# Patient Record
Sex: MALE | Race: WHITE | NOT HISPANIC OR LATINO | Employment: OTHER | URBAN - METROPOLITAN AREA
[De-identification: names, ages, dates, MRNs, and addresses within clinical notes are randomized per-mention and may not be internally consistent; named-entity substitution may affect disease eponyms.]

---

## 2017-11-13 ENCOUNTER — ALLSCRIPTS OFFICE VISIT (OUTPATIENT)
Dept: OTHER | Facility: OTHER | Age: 72
End: 2017-11-13

## 2017-11-13 DIAGNOSIS — I10 ESSENTIAL (PRIMARY) HYPERTENSION: ICD-10-CM

## 2017-11-14 NOTE — PROGRESS NOTES
Assessment  Assessed    1  CAD (coronary artery disease) (414 00) (I25 10)   2  PAC (premature atrial contraction) (427 61) (I49 1)   3  Hyperlipidemia (272 4) (E78 5)   4  Hypertension (401 9) (I10)    Plan  CAD (coronary artery disease), PAC (premature atrial contraction)    · EKG/ECG- POC; Status:Complete;   Done: [id number]   Perform: In Office; [phone]; Last Updated By:Scheier, Lillard Pan; 11/13/2017 9:06:00 AM;Ordered;(coronary artery disease), PAC (premature atrial contraction); Ordered By:Michel Garcia; Hypertension    · HOLTER MONITOR - 24 HOUR; Status:Hold For - Scheduling; Requestedfor:13Nov2017;    Perform:[facility]; NQI:[id number]; Ordered;Ordered By:Michel Garcia;   · Follow-up visit in 1 year Evaluation and Treatment  Follow-up  Status: Complete  Done:13Nov2017   Ordered; Hypertension; Ordered By: Agustina Shrestha Performed:  Due: 54VOS4851; Last Updated By: Jeanie Alex; 11/13/2017 9:16:55 AM    Discussion/Summary  Cardiology Discussion Summary Free Text Note Form [facility]:   Atrial parasystolic, well controlled beta blocker therapy  Stress test 2013 waswith mild inferior ischemia, manage medically  Clinically asymptomatic  An echocardiogram revealed normal left ventricular systolic function with mild mitral and tricuspid insufficiency with estimated normal pulmonary artery pressures as suggested by Doppler criteria  Left atrial enlargement mildly increased  Continue current medical regimen  We'll repeat a Holter monitor  Chief Complaint  Chief Complaint Free Text Note Form: Pt  here for yearly follow up  Pt  has occasional palpitations  History of Present Illness  Cardiology HPI Free Text Note Form [facility]: Cardiology follow-up  Patient continues to do well  Occasional palpitations  No syncope or presyncope  He still very active  Comply medications, his lipids were recently checked, I do not have the fractionation, apparently under good control   Denies any chest pain      Review of Systems  Cardiology Male ROS:    Cardiac: rhythm problems, but-- no chest pain,-- no fainting/blackouts,-- no heart murmur present,-- no signs of swelling-- and-- no palpitations present  Skin: No complaints of nonhealing sores or skin rash  Genitourinary: No complaints of recurrent urinary tract infections, frequent urination at night, difficult urination, blood in urine, kidney stones, loss of bladder control, no kidney or prostate problems, no erectile dysfunction  -- and-- no kidney problems  Psychological: No complaints of feeling depressed, anxiety, panic attacks, or difficulty concentrating  General: No complaints of trouble sleeping, lack of energy, fatigue, appetite changes, weight changes, fever, frequent infections, or night sweats  ,-- no trouble sleeping,-- no changes in weight-- and-- no lack of energy/fatigue  Respiratory: No complaints of shortness of breath, cough with sputum, or wheezing -- and-- no shortness of breath  HEENT: No complaints of serious problems, hearing problems, nose problems, throat problems, or snoring  Gastrointestinal: No complaints of liver problems, nausea, vomiting, heartburn, constipation, bloody stools, diarrhea, problems swallowing, adbominal pain, or rectal bleeding -- and-- no liver problems  Hematologic: No complaints of bleeding disorders, anemia, blood clots, or excessive brusing  Neurological: No complaints of numbness, tingling, dizziness, weakness, seizures, headaches, syncope or fainting, AM fatigue, daytime sleepiness, no witnessed apnea episodes  Musculoskeletal: No complaints of arthritis, back pain, or painfull swelling -- No myalgias on statin  Active Problems  Problems    1  CAD (coronary artery disease) (414 00) (I25 10)   2  Hyperlipidemia (272 4) (E78 5)   3  Hypertension (401 9) (I10)   4  PAC (premature atrial contraction) (427 61) (I49 1)    Past Medical History  Problems    1   History of Abnormal EKG (794 31) (R94 31)  Active Problems And Past Medical History Reviewed: The active problems and past medical history were reviewed and updated today  Surgical History  Problems    1  History of Tonsillectomy  Surgical History Reviewed: The surgical history was reviewed and updated today  Family History  Mother    1  Family history of hypertension (V17 49) (Z82 49)  Father    2  Family history of hypertension (V17 49) (Z82 49)   3  FH: colon cancer (V16 0) (Z80 0)   4  Family history of Graphite fibrosis (of lung)  Family History Reviewed: The family history was reviewed and updated today  Social History  Problems    · Never smoker   · Retired  Social History Reviewed: The social history was reviewed and is unchanged  Current Meds   1  Aspirin 81 MG TABS; TAKE 1 TABLET DAILY; Therapy: (Recorded:02Nov2015) to Recorded   2  Daily Multivitamin TABS; Therapy: (Brisa Proctor) to Recorded   3  Lisinopril 10 MG Oral Tablet; TAKE 1 TABLET DAILY; Therapy: (0664 313 06 34) to Recorded   4  Lovastatin 20 MG Oral Tablet; TAKE 1 TABLET DAILY AS DIRECTED; Therapy: (0664 313 06 34) to Recorded   5  Metoprolol Tartrate 25 MG Oral Tablet; TAKE 1 TABLET TWICE DAILY  Requested for: 26Apr2017; Last Rx:26Apr2017 Ordered  Medication List Reviewed: The medication list was reviewed and updated today  Allergies  Medication    1  No Known Drug Allergies    Vitals  Vital Signs    Recorded: [id number] 09:04AM   Heart Rate 60, Apical   Pulse Quality Regular, Apical   Systolic 686, RUE, Sitting   Diastolic 80, RUE, Sitting   Height 5 ft 10 in   Weight 173 lb    BMI Calculated 24 82   BSA Calculated 1 96       Physical Exam   Constitutional  General appearance: No acute distress, well appearing and well nourished  appears healthy,-- within normal limits of ideal weight,-- well hydrated-- and-- appearance reflects stated age  Neck  Neck and thyroid: Normal, supple, trachea midline, no thyromegaly  Jugular Veins: the JVP was not elevated  Pulmonary  Respiratory effort: No increased work of breathing or signs of respiratory distress  Respiratory rate: normal  Assessment of respiratory effort revealed normal rhythm and effort  Auscultation of lungs: Clear to auscultation, no rales, no rhonchi, no wheezing, good air movement  Auscultation of the lungs revealed no expiratory wheezing,-- normal expiratory time-- and-- no inspiratory wheezing  no rales or crackles were heard bilaterally  no rhonchi  no friction rub  no wheezing  no diminished breath sounds  no bronchial breath sounds  Cardiovascular  Palpation of heart: Normal PMI, no thrills  The PMI was palpated at the 5th LICS in the midclavicular line  The apical impulse was normal  no precordial heave was noted  Auscultation of heart: Abnormal   The heart rate was normal  The rhythm was regular with premature beats  Heart sounds: normal S1,-- normal S2,-- no gallop heard,-- no S3-- and-- no S4  No pericardial rub  Bigeminy pattern  no murmurs were heard  Carotid pulses: Normal, 2+ bilaterally  Pedal pulses: Normal, 2+ bilaterally  Examination of extremities for edema and/or varicosities: Normal    Chest - Chest: Normal   Neurologic - Speech: Normal    Psychiatric - Orientation to person, place, and time: Normal -- Mood and affect: Normal       Results/Data  ECG Report:  Rhythm and rate:  normal sinus rhythm  Ectopic Beats: Occasional unifocal premature ventricular beats are present  Frequent atrial premature contractions are present  QRS: poor R-wave progresion  Comparison to prior ECGs: no interval change   Probable atrial parasystolic      Signatures   Electronically signed by : PACO Rob ; Nov 13 2017  9:19AM EST                       (Author)

## 2017-11-22 ENCOUNTER — HOSPITAL ENCOUNTER (OUTPATIENT)
Dept: NON INVASIVE DIAGNOSTICS | Facility: HOSPITAL | Age: 72
Discharge: HOME | End: 2017-11-22
Payer: MEDICARE

## 2017-11-22 DIAGNOSIS — I10 ESSENTIAL (PRIMARY) HYPERTENSION: ICD-10-CM

## 2017-11-22 PROCEDURE — 93226 XTRNL ECG REC<48 HR SCAN A/R: CPT

## 2017-11-22 PROCEDURE — 93225 XTRNL ECG REC<48 HRS REC: CPT

## 2018-01-13 VITALS
HEIGHT: 70 IN | DIASTOLIC BLOOD PRESSURE: 80 MMHG | SYSTOLIC BLOOD PRESSURE: 140 MMHG | BODY MASS INDEX: 24.77 KG/M2 | WEIGHT: 173 LBS | HEART RATE: 60 BPM

## 2018-04-30 DIAGNOSIS — I10 HYPERTENSION, UNSPECIFIED TYPE: Primary | ICD-10-CM

## 2018-11-13 ENCOUNTER — OFFICE VISIT (OUTPATIENT)
Dept: CARDIOLOGY CLINIC | Facility: CLINIC | Age: 73
End: 2018-11-13
Payer: MEDICARE

## 2018-11-13 VITALS
HEART RATE: 54 BPM | DIASTOLIC BLOOD PRESSURE: 80 MMHG | BODY MASS INDEX: 24.34 KG/M2 | HEIGHT: 70 IN | WEIGHT: 170 LBS | SYSTOLIC BLOOD PRESSURE: 140 MMHG

## 2018-11-13 DIAGNOSIS — I49.1: Chronic | ICD-10-CM

## 2018-11-13 DIAGNOSIS — I25.10 CORONARY ARTERY DISEASE INVOLVING NATIVE CORONARY ARTERY OF NATIVE HEART WITHOUT ANGINA PECTORIS: Primary | ICD-10-CM

## 2018-11-13 PROCEDURE — 93000 ELECTROCARDIOGRAM COMPLETE: CPT | Performed by: INTERNAL MEDICINE

## 2018-11-13 PROCEDURE — 99213 OFFICE O/P EST LOW 20 MIN: CPT | Performed by: INTERNAL MEDICINE

## 2018-11-13 RX ORDER — LISINOPRIL 10 MG/1
1 TABLET ORAL DAILY
COMMUNITY
End: 2021-06-08 | Stop reason: SDUPTHER

## 2018-11-13 RX ORDER — LOVASTATIN 20 MG/1
TABLET ORAL
Refills: 0 | COMMUNITY
Start: 2018-11-07 | End: 2021-07-27 | Stop reason: SDUPTHER

## 2018-11-13 RX ORDER — ERGOCALCIFEROL (VITAMIN D2) 10 MCG
TABLET ORAL
COMMUNITY

## 2018-11-13 NOTE — PROGRESS NOTES
Cardiology Follow Up    Alfredo Jay  [birth year]  [id number]  [address] [facility] [address] [place] [address]  [phone]    1  Coronary artery disease involving native coronary artery of native heart without angina pectoris  POCT ECG   2  Atrial parasystole         Interval History:   Cardiology follow-up  Patient continues to do well, is mostly asymptomatic, he gets dyspnea class 1 with unusual efforts no chest pain no syncope presyncope  Overall very active  He had recent lipid profile apparently under adequate control  Patient Active Problem List   Diagnosis    Atrial parasystole     No past medical history on file  Social History     Social History    Marital status: /Civil Union     Spouse name: N/A    Number of children: N/A    Years of education: N/A     Occupational History    Not on file  Social History Main Topics    Smoking status: Never Smoker    Smokeless tobacco: Never Used    Alcohol use Not on file    Drug use: Unknown    Sexual activity: Not on file     Other Topics Concern    Not on file     Social History Narrative    No narrative on file      No family history on file  No past surgical history on file  Current Outpatient Prescriptions:     aspirin 81 MG tablet, Take 1 tablet by mouth daily, Disp: , Rfl:     lisinopril (ZESTRIL) 10 mg tablet, Take 1 tablet by mouth daily, Disp: , Rfl:     lovastatin (MEVACOR) 20 mg tablet, , Disp: , Rfl: 0    metoprolol tartrate (LOPRESSOR) 25 mg tablet, Take 1 tablet (25 mg total) by mouth 2 (two) times a day, Disp: 180 tablet, Rfl: 3    Multiple Vitamin (DAILY VALUE MULTIVITAMIN) TABS, Take by mouth, Disp: , Rfl:   No Known Allergies    Labs:  No visits with results within 6 Month(s) from this visit  Latest known visit with results is:   No results found for any previous visit  Imaging: No results found      Review of Systems:  Review of Systems   Constitutional: Negative for activity change and fatigue  Respiratory: Positive for shortness of breath  Negative for apnea, wheezing and stridor  Cardiovascular: Negative for chest pain, palpitations and leg swelling  Gastrointestinal: Negative for abdominal pain  Musculoskeletal: Negative for myalgias  Neurological: Negative for dizziness and syncope  Physical Exam:  Physical Exam   Constitutional: He is oriented to person, place, and time  He appears well-developed and well-nourished  No distress  Eyes: No scleral icterus  Cardiovascular: Normal rate and intact distal pulses  Exam reveals no friction rub  No murmur heard  Frequent extrasystoles, bigeminy pattern   Pulmonary/Chest: Effort normal and breath sounds normal  No respiratory distress  He has no wheezes  He has no rales  He exhibits no tenderness  Neurological: He is alert and oriented to person, place, and time  Skin: He is not diaphoretic  Psychiatric: He has a normal mood and affect  Discussion/Summary:  Cardiac arrhythmia, atrial para systole, stable on low-dose beta-blocker  No new recommendations  Holter monitor last year revealed resting normal sinus rhythm, PAC burden of 37 percent secondary to the atrial per systole  Abnormal stress test in 2013, possible inferior ischemia on nuclear portion  Left systolic function normal with mild mitral tricuspid insufficiency estimated normal pulmonary pressures suggested by Doppler analysis    Repeat a regular stress test

## 2018-11-20 ENCOUNTER — HOSPITAL ENCOUNTER (OUTPATIENT)
Dept: NON INVASIVE DIAGNOSTICS | Facility: CLINIC | Age: 73
Discharge: HOME | End: 2018-11-20
Payer: MEDICARE

## 2018-11-20 DIAGNOSIS — I49.1: Chronic | ICD-10-CM

## 2018-11-20 DIAGNOSIS — I25.10 CORONARY ARTERY DISEASE INVOLVING NATIVE CORONARY ARTERY OF NATIVE HEART WITHOUT ANGINA PECTORIS: ICD-10-CM

## 2018-11-20 LAB
CHEST PAIN STATEMENT: NORMAL
MAX DIASTOLIC BP: 86 MMHG
MAX HEART RATE: 139 BPM
MAX PREDICTED HEART RATE: 146 BPM
MAX. SYSTOLIC BP: 212 MMHG
PROTOCOL NAME: NORMAL
REASON FOR TERMINATION: NORMAL
TARGET HR FORMULA: NORMAL
TEST INDICATION: NORMAL
TIME IN EXERCISE PHASE: NORMAL

## 2018-11-20 PROCEDURE — 93016 CV STRESS TEST SUPVJ ONLY: CPT | Performed by: INTERNAL MEDICINE

## 2018-11-20 PROCEDURE — 93018 CV STRESS TEST I&R ONLY: CPT | Performed by: INTERNAL MEDICINE

## 2018-11-20 PROCEDURE — 93017 CV STRESS TEST TRACING ONLY: CPT

## 2019-04-17 DIAGNOSIS — I10 HYPERTENSION, UNSPECIFIED TYPE: ICD-10-CM

## 2019-11-21 ENCOUNTER — OFFICE VISIT (OUTPATIENT)
Dept: CARDIOLOGY CLINIC | Facility: CLINIC | Age: 74
End: 2019-11-21
Payer: MEDICARE

## 2019-11-21 VITALS
BODY MASS INDEX: 24.05 KG/M2 | HEART RATE: 60 BPM | DIASTOLIC BLOOD PRESSURE: 80 MMHG | HEIGHT: 70 IN | SYSTOLIC BLOOD PRESSURE: 166 MMHG | WEIGHT: 168 LBS

## 2019-11-21 DIAGNOSIS — R94.39 ABNORMAL STRESS TEST: ICD-10-CM

## 2019-11-21 DIAGNOSIS — E78.00 HYPERCHOLESTEROLEMIA: ICD-10-CM

## 2019-11-21 DIAGNOSIS — I10 HYPERTENSION, ESSENTIAL, BENIGN: ICD-10-CM

## 2019-11-21 DIAGNOSIS — I49.1: Primary | Chronic | ICD-10-CM

## 2019-11-21 PROCEDURE — 93000 ELECTROCARDIOGRAM COMPLETE: CPT | Performed by: INTERNAL MEDICINE

## 2019-11-21 PROCEDURE — 99213 OFFICE O/P EST LOW 20 MIN: CPT | Performed by: INTERNAL MEDICINE

## 2019-11-21 NOTE — PROGRESS NOTES
Cardiology Follow Up    Theresa Mazariegos  1945  [id number]  [facility]  [facility]  [facility]   [phone]    1  Atrial parasystole  POCT ECG   2  Abnormal stress test     3  Hypercholesterolemia     4  Hypertension, essential, benign         Interval History:   Cardiology follow-up  Patient continues to do well, is still very active  No exertional symptoms no chest pain or dyspnea  No palpitations, no syncope or presyncope compliant with low-cholesterol diet  He is on statin therapy  Compliant low-sodium diet, blood pressures been well control as well  Today's is slightly elevated,    Patient Active Problem List   Diagnosis    Atrial parasystole    Abnormal stress test    Hypercholesterolemia    Hypertension, essential, benign     No past medical history on file    Social History     Socioeconomic History    Marital status: /Civil Union     Spouse name: Not on file    Number of children: Not on file    Years of education: Not on file    Highest education level: Not on file   Occupational History    Not on file   Social Needs    Financial resource strain: Not on file    Food insecurity:     Worry: Not on file     Inability: Not on file    Transportation needs:     Medical: Not on file     Non-medical: Not on file   Tobacco Use    Smoking status: Never Smoker    Smokeless tobacco: Never Used   Substance and Sexual Activity    Alcohol use: Not on file    Drug use: Not on file    Sexual activity: Not on file   Lifestyle    Physical activity:     Days per week: Not on file     Minutes per session: Not on file    Stress: Not on file   Relationships    Social connections:     Talks on phone: Not on file     Gets together: Not on file     Attends Congregation service: Not on file     Active member of club or organization: Not on file     Attends meetings of clubs or organizations: Not on file     Relationship status: Not on file    Intimate partner violence:     Fear of current or ex partner: Not on file     Emotionally abused: Not on file     Physically abused: Not on file     Forced sexual activity: Not on file   Other Topics Concern    Not on file   Social History Narrative    Not on file      No family history on file  No past surgical history on file  Current Outpatient Medications:     aspirin 81 MG tablet, Take 1 tablet by mouth daily, Disp: , Rfl:     lisinopril (ZESTRIL) 10 mg tablet, Take 1 tablet by mouth daily, Disp: , Rfl:     lovastatin (MEVACOR) 20 mg tablet, , Disp: , Rfl: 0    metoprolol tartrate (LOPRESSOR) 25 mg tablet, Take 1 tablet (25 mg total) by mouth 2 (two) times a day, Disp: 180 tablet, Rfl: 3    Multiple Vitamin (DAILY VALUE MULTIVITAMIN) TABS, Take by mouth, Disp: , Rfl:   No Known Allergies    Labs:  No visits with results within 6 Month(s) from this visit  Latest known visit with results is:   Hospital Outpatient Visit on 11/20/2018   Component Date Value    Protocol Name 11/20/2018 Zechariah Jacob Time In Exercise Phase 11/20/2018 00:07:01     MAX  SYSTOLIC BP 14/83/4005 769     Max Diastolic Bp 24/48/6180 86     Max Heart Rate 11/20/2018 139     Max Predicted Heart Rate 11/20/2018 146     Reason for Termination 11/20/2018 Fatigue     Test Indication 11/20/2018 CAD     Target Hr Formular 11/20/2018 (220 - Age)*100%     Chest Pain Statement 11/20/2018 none      Imaging: No results found  Review of Systems:  Review of Systems    Physical Exam:  Physical Exam    Discussion/Summary:  Atrial para systole, well control on beta-blocker, Holter monitor 2 years ago revealed normal sinus rhythm, 37% burden of PACs in the setting of para systole  No sustained tachyarrhythmias or bradyarrhythmias  Stress test 2013 revealed questionable mild inferior ischemia stress test last year he did 7 minutes of Vinicio protocol    There were borderline EKG changes for ischemia  But no symptoms  Echocardiogram has been ordered  lipid profile recently done, adequate control, will try to obtain a copy of the results  This note was completed in part utilizing [employer] fluency direct voice recognition software  Grammatical errors, random word insertion, spelling mistakes, and incomplete sentences may be an occasional consequence of the system secondary to software limitations, ambient noise and hardware issues  At the time of dictation, efforts were made to edit, clarify and /or correct errors  Please read the chart carefully and recognize, using context, where substitutions have occurred  If you have any questions or concerns about the context, text or information contained within the body of this dictation, please contact myself, the provider, for further clarification

## 2020-01-10 ENCOUNTER — HOSPITAL ENCOUNTER (OUTPATIENT)
Dept: NON INVASIVE DIAGNOSTICS | Facility: CLINIC | Age: 75
Discharge: HOME | End: 2020-01-10
Payer: MEDICARE

## 2020-01-10 DIAGNOSIS — E78.00 HYPERCHOLESTEROLEMIA: ICD-10-CM

## 2020-01-10 DIAGNOSIS — I10 HYPERTENSION, ESSENTIAL, BENIGN: ICD-10-CM

## 2020-01-10 DIAGNOSIS — I49.1: Chronic | ICD-10-CM

## 2020-01-10 DIAGNOSIS — R94.39 ABNORMAL STRESS TEST: ICD-10-CM

## 2020-01-10 PROCEDURE — 93306 TTE W/DOPPLER COMPLETE: CPT | Performed by: INTERNAL MEDICINE

## 2020-01-10 PROCEDURE — 93306 TTE W/DOPPLER COMPLETE: CPT

## 2020-03-26 DIAGNOSIS — I10 HYPERTENSION, UNSPECIFIED TYPE: ICD-10-CM

## 2020-10-05 DIAGNOSIS — I10 HYPERTENSION, UNSPECIFIED TYPE: ICD-10-CM

## 2020-12-11 ENCOUNTER — OFFICE VISIT (OUTPATIENT)
Dept: CARDIOLOGY CLINIC | Facility: CLINIC | Age: 75
End: 2020-12-11
Payer: MEDICARE

## 2020-12-11 VITALS
HEIGHT: 70 IN | BODY MASS INDEX: 24.78 KG/M2 | SYSTOLIC BLOOD PRESSURE: 136 MMHG | WEIGHT: 173.1 LBS | HEART RATE: 64 BPM | DIASTOLIC BLOOD PRESSURE: 84 MMHG

## 2020-12-11 DIAGNOSIS — I49.1: Primary | Chronic | ICD-10-CM

## 2020-12-11 DIAGNOSIS — I10 HYPERTENSION, ESSENTIAL, BENIGN: ICD-10-CM

## 2020-12-11 DIAGNOSIS — R94.39 ABNORMAL STRESS TEST: ICD-10-CM

## 2020-12-11 DIAGNOSIS — E78.00 HYPERCHOLESTEROLEMIA: ICD-10-CM

## 2020-12-11 PROCEDURE — 99213 OFFICE O/P EST LOW 20 MIN: CPT | Performed by: INTERNAL MEDICINE

## 2021-02-11 DIAGNOSIS — Z23 ENCOUNTER FOR IMMUNIZATION: ICD-10-CM

## 2021-02-12 ENCOUNTER — IMMUNIZATIONS (OUTPATIENT)
Dept: FAMILY MEDICINE CLINIC | Facility: HOSPITAL | Age: 76
End: 2021-02-12

## 2021-02-12 DIAGNOSIS — Z23 ENCOUNTER FOR IMMUNIZATION: Primary | ICD-10-CM

## 2021-02-12 PROCEDURE — 91300 SARS-COV-2 / COVID-19 MRNA VACCINE (PFIZER-BIONTECH) 30 MCG: CPT

## 2021-02-12 PROCEDURE — 0001A SARS-COV-2 / COVID-19 MRNA VACCINE (PFIZER-BIONTECH) 30 MCG: CPT

## 2021-03-05 ENCOUNTER — IMMUNIZATIONS (OUTPATIENT)
Dept: FAMILY MEDICINE CLINIC | Facility: HOSPITAL | Age: 76
End: 2021-03-05

## 2021-03-05 DIAGNOSIS — Z23 ENCOUNTER FOR IMMUNIZATION: Primary | ICD-10-CM

## 2021-03-05 PROCEDURE — 91300 SARS-COV-2 / COVID-19 MRNA VACCINE (PFIZER-BIONTECH) 30 MCG: CPT

## 2021-03-05 PROCEDURE — 0002A SARS-COV-2 / COVID-19 MRNA VACCINE (PFIZER-BIONTECH) 30 MCG: CPT

## 2021-04-13 DIAGNOSIS — I10 HYPERTENSION, UNSPECIFIED TYPE: ICD-10-CM

## 2021-06-08 DIAGNOSIS — I10 HYPERTENSION, ESSENTIAL, BENIGN: Primary | ICD-10-CM

## 2021-06-09 RX ORDER — LISINOPRIL 10 MG/1
10 TABLET ORAL DAILY
Qty: 90 TABLET | Refills: 0 | Status: SHIPPED | OUTPATIENT
Start: 2021-06-09 | End: 2021-09-01 | Stop reason: SDUPTHER

## 2021-07-27 DIAGNOSIS — E78.00 HYPERCHOLESTEROLEMIA: Primary | ICD-10-CM

## 2021-07-28 RX ORDER — LOVASTATIN 20 MG/1
20 TABLET ORAL DAILY
Qty: 90 TABLET | Refills: 1 | Status: SHIPPED | OUTPATIENT
Start: 2021-07-28 | End: 2022-01-26 | Stop reason: SDUPTHER

## 2021-08-31 ENCOUNTER — VBI (OUTPATIENT)
Dept: ADMINISTRATIVE | Facility: OTHER | Age: 76
End: 2021-08-31

## 2021-08-31 NOTE — TELEPHONE ENCOUNTER
Upon review of the In Basket request we were able to locate, review, and update the patient chart as requested for Immunization(s)   and Medicare AWV  Any additional questions or concerns should be emailed to the Practice Liaisons via [email or web address] email, please do not reply via In Basket      Thank you  Ruba Luz

## 2021-09-01 DIAGNOSIS — I10 HYPERTENSION, ESSENTIAL, BENIGN: ICD-10-CM

## 2021-09-02 RX ORDER — LISINOPRIL 10 MG/1
10 TABLET ORAL DAILY
Qty: 90 TABLET | Refills: 1 | Status: SHIPPED | OUTPATIENT
Start: 2021-09-02 | End: 2022-03-09 | Stop reason: SDUPTHER

## 2021-10-11 DIAGNOSIS — I10 HYPERTENSION, UNSPECIFIED TYPE: ICD-10-CM

## 2021-10-27 LAB
25(OH)D3 SERPL-MCNC: 40 NG/ML (ref 30–100)
ALBUMIN SERPL-MCNC: 4.4 G/DL (ref 3.6–5.1)
ALBUMIN/GLOB SERPL: 1.6 (CALC) (ref 1–2.5)
ALP SERPL-CCNC: 79 U/L (ref 35–144)
ALT SERPL-CCNC: 14 U/L (ref 9–46)
AST SERPL-CCNC: 27 U/L (ref 10–35)
BASOPHILS # BLD AUTO: 42 CELLS/UL (ref 0–200)
BASOPHILS NFR BLD AUTO: 0.7 %
BILIRUB SERPL-MCNC: 0.7 MG/DL (ref 0.2–1.2)
BUN SERPL-MCNC: 18 MG/DL (ref 7–25)
BUN/CREAT SERPL: ABNORMAL (CALC) (ref 6–22)
CALCIUM SERPL-MCNC: 9.2 MG/DL (ref 8.6–10.3)
CHLORIDE SERPL-SCNC: 102 MMOL/L (ref 98–110)
CHOLEST SERPL-MCNC: 193 MG/DL
CHOLEST/HDLC SERPL: 3 (CALC)
CO2 SERPL-SCNC: 29 MMOL/L (ref 20–32)
CREAT SERPL-MCNC: 0.88 MG/DL (ref 0.7–1.18)
EOSINOPHIL # BLD AUTO: 72 CELLS/UL (ref 15–500)
EOSINOPHIL NFR BLD AUTO: 1.2 %
ERYTHROCYTE [DISTWIDTH] IN BLOOD BY AUTOMATED COUNT: 11.7 % (ref 11–15)
GLOBULIN SER CALC-MCNC: 2.8 G/DL (CALC) (ref 1.9–3.7)
GLUCOSE SERPL-MCNC: 101 MG/DL (ref 65–99)
HBA1C MFR BLD: 5.4 % OF TOTAL HGB
HCT VFR BLD AUTO: 41.5 % (ref 38.5–50)
HDLC SERPL-MCNC: 65 MG/DL
HGB BLD-MCNC: 13.8 G/DL (ref 13.2–17.1)
LDLC SERPL CALC-MCNC: 111 MG/DL (CALC)
LYMPHOCYTES # BLD AUTO: 1614 CELLS/UL (ref 850–3900)
LYMPHOCYTES NFR BLD AUTO: 26.9 %
MCH RBC QN AUTO: 30.7 PG (ref 27–33)
MCHC RBC AUTO-ENTMCNC: 33.3 G/DL (ref 32–36)
MCV RBC AUTO: 92.2 FL (ref 80–100)
MONOCYTES # BLD AUTO: 540 CELLS/UL (ref 200–950)
MONOCYTES NFR BLD AUTO: 9 %
NEUTROPHILS # BLD AUTO: 3732 CELLS/UL (ref 1500–7800)
NEUTROPHILS NFR BLD AUTO: 62.2 %
NONHDLC SERPL-MCNC: 128 MG/DL (CALC)
PLATELET # BLD AUTO: 227 THOUSAND/UL (ref 140–400)
PMV BLD REES-ECKER: 10.7 FL (ref 7.5–12.5)
POTASSIUM SERPL-SCNC: 4 MMOL/L (ref 3.5–5.3)
PROT SERPL-MCNC: 7.2 G/DL (ref 6.1–8.1)
PSA FREE MFR SERPL: 33 % (CALC)
PSA FREE SERPL-MCNC: 0.2 NG/ML
PSA SERPL-MCNC: 0.6 NG/ML
RBC # BLD AUTO: 4.5 MILLION/UL (ref 4.2–5.8)
SL AMB EGFR AFRICAN AMERICAN: 96 ML/MIN/1.73M2
SL AMB EGFR NON AFRICAN AMERICAN: 83 ML/MIN/1.73M2
SODIUM SERPL-SCNC: 139 MMOL/L (ref 135–146)
TRIGL SERPL-MCNC: 80 MG/DL
TSH SERPL-ACNC: 2.45 MIU/L (ref 0.4–4.5)
WBC # BLD AUTO: 6 THOUSAND/UL (ref 3.8–10.8)

## 2021-11-16 PROBLEM — R94.39 ABNORMAL STRESS TEST: Status: RESOLVED | Noted: 2019-11-21 | Resolved: 2021-11-16

## 2021-11-18 ENCOUNTER — OFFICE VISIT (OUTPATIENT)
Dept: INTERNAL MEDICINE CLINIC | Facility: CLINIC | Age: 76
End: 2021-11-18
Payer: MEDICARE

## 2021-11-18 VITALS
TEMPERATURE: 97.6 F | BODY MASS INDEX: 24.57 KG/M2 | HEIGHT: 70 IN | WEIGHT: 171.6 LBS | SYSTOLIC BLOOD PRESSURE: 152 MMHG | DIASTOLIC BLOOD PRESSURE: 78 MMHG | OXYGEN SATURATION: 96 % | HEART RATE: 73 BPM

## 2021-11-18 DIAGNOSIS — Z11.59 NEED FOR HEPATITIS C SCREENING TEST: Primary | ICD-10-CM

## 2021-11-18 DIAGNOSIS — I10 HYPERTENSION, ESSENTIAL, BENIGN: ICD-10-CM

## 2021-11-18 DIAGNOSIS — E78.00 HYPERCHOLESTEROLEMIA: ICD-10-CM

## 2021-11-18 DIAGNOSIS — Z00.00 MEDICARE ANNUAL WELLNESS VISIT, SUBSEQUENT: ICD-10-CM

## 2021-11-18 PROCEDURE — G0438 PPPS, INITIAL VISIT: HCPCS | Performed by: NURSE PRACTITIONER

## 2021-11-18 PROCEDURE — 99214 OFFICE O/P EST MOD 30 MIN: CPT | Performed by: NURSE PRACTITIONER

## 2021-11-18 PROCEDURE — 1123F ACP DISCUSS/DSCN MKR DOCD: CPT | Performed by: NURSE PRACTITIONER

## 2022-01-26 DIAGNOSIS — E78.00 HYPERCHOLESTEROLEMIA: ICD-10-CM

## 2022-01-26 RX ORDER — LOVASTATIN 20 MG/1
20 TABLET ORAL DAILY
Qty: 90 TABLET | Refills: 1 | Status: SHIPPED | OUTPATIENT
Start: 2022-01-26 | End: 2022-05-25 | Stop reason: SDUPTHER

## 2022-02-09 ENCOUNTER — OFFICE VISIT (OUTPATIENT)
Dept: CARDIOLOGY CLINIC | Facility: CLINIC | Age: 77
End: 2022-02-09
Payer: MEDICARE

## 2022-02-09 VITALS
BODY MASS INDEX: 25.07 KG/M2 | SYSTOLIC BLOOD PRESSURE: 200 MMHG | WEIGHT: 174.7 LBS | DIASTOLIC BLOOD PRESSURE: 90 MMHG | HEART RATE: 61 BPM

## 2022-02-09 DIAGNOSIS — I25.10 CORONARY ARTERY DISEASE INVOLVING NATIVE CORONARY ARTERY OF NATIVE HEART WITHOUT ANGINA PECTORIS: ICD-10-CM

## 2022-02-09 DIAGNOSIS — E78.00 HYPERCHOLESTEROLEMIA: ICD-10-CM

## 2022-02-09 DIAGNOSIS — I49.1 PAC (PREMATURE ATRIAL CONTRACTION): ICD-10-CM

## 2022-02-09 DIAGNOSIS — I10 HYPERTENSION, ESSENTIAL, BENIGN: Primary | ICD-10-CM

## 2022-02-09 PROCEDURE — 93000 ELECTROCARDIOGRAM COMPLETE: CPT | Performed by: INTERNAL MEDICINE

## 2022-02-09 PROCEDURE — 99213 OFFICE O/P EST LOW 20 MIN: CPT | Performed by: INTERNAL MEDICINE

## 2022-02-09 NOTE — PROGRESS NOTES
Cardiology Follow Up    James Fitzpatrick  1945  [id number]  [facility] [place]  [address] [address] [facility]   [phone]    1  Hypertension, essential, benign  POCT ECG   2  PAC (premature atrial contraction)     3  Hypercholesterolemia     4  Coronary artery disease involving native coronary artery of native heart without angina pectoris         Interval History:  Cardiology follow-up  Patient continues to do well, he can engage in heavy physical labor without any exertional symptoms, denies any chest pain dyspnea palpitations, denies any syncope or presyncope states been compliant low-sodium diet, his blood pressure today initially 200/90, after 5 minutes was 170/95  States this significant white coat component  I do not have any ambulatory blood pressures however  She is compliant low-cholesterol diet lipids last checked total cholesterol 193 HDL 65  and 11 on medium intensity statin therapy      Patient Active Problem List   Diagnosis    Hypercholesterolemia    Hypertension, essential, benign    CAD (coronary artery disease)    PAC (premature atrial contraction)     Past Medical History:   Diagnosis Date    Abnormal stress test 11/21/2019    Atherosclerosis of native coronary artery without angina pectoris     Glaucoma     Hypertension     Immunization refused     Mixed hyperlipidemia      Social History     Socioeconomic History    Marital status: /Civil Union     Spouse name: Not on file    Number of children: Not on file    Years of education: Not on file    Highest education level: Not on file   Occupational History    Not on file   Tobacco Use    Smoking status: Never Smoker    Smokeless tobacco: Never Used   Substance and Sexual Activity    Alcohol use: Not on file    Drug use: Not on file    Sexual activity: Not on file   Other Topics Concern    Not on file   Social History Narrative    Not on file     Social Determinants of Health     Financial Resource Strain: Not on file   Food Insecurity: Not on file   Transportation Needs: Not on file   Physical Activity: Not on file   Stress: Not on file   Social Connections: Not on file   Intimate Partner Violence: Not on file   Housing Stability: Not on file      Family History   Problem Relation Age of Onset    Hypertension Mother     Hypertension Father     Heart disease Father     Cancer Father      Past Surgical History:   Procedure Laterality Date    COLONOSCOPY  2018       Current Outpatient Medications:     aspirin 81 MG tablet, Take 1 tablet by mouth daily, Disp: , Rfl:     lisinopril (ZESTRIL) 10 mg tablet, Take 1 tablet (10 mg total) by mouth daily, Disp: 90 tablet, Rfl: 1    lovastatin (MEVACOR) 20 mg tablet, Take 1 tablet (20 mg total) by mouth daily, Disp: 90 tablet, Rfl: 1    metoprolol tartrate (LOPRESSOR) 25 mg tablet, Take 1 tablet (25 mg total) by mouth 2 (two) times a day, Disp: 180 tablet, Rfl: 1    Multiple Vitamin (DAILY VALUE MULTIVITAMIN) TABS, Take by mouth, Disp: , Rfl:   No Known Allergies    Labs:  Orders Only on 10/26/2021   Component Date Value    Total Cholesterol 10/26/2021 193     HDL 10/26/2021 65     Triglycerides 10/26/2021 80     LDL Calculated 10/26/2021 111*    Chol HDLC Ratio 10/26/2021 3 0     Non-HDL Cholesterol 10/26/2021 128     Glucose, Random 10/26/2021 101*    BUN 10/26/2021 18     Creatinine 10/26/2021 0 88     eGFR Non  10/26/2021 83     eGFR  10/26/2021 96     SL AMB BUN/CREATININE RA* 53/76/3580 NOT APPLICABLE     Sodium 20/66/6751 139     Potassium 10/26/2021 4 0     Chloride 10/26/2021 102     CO2 10/26/2021 29     Calcium 10/26/2021 9 2     Protein, Total 10/26/2021 7 2     Albumin 10/26/2021 4 4     Globulin 10/26/2021 2 8     Albumin/Globulin Ratio 10/26/2021 1 6     TOTAL BILIRUBIN 10/26/2021 0 7     Alkaline Phosphatase 10/26/2021 79     AST 10/26/2021 27     ALT 10/26/2021 14     White Blood Cell Count 10/26/2021 6 0     Red Blood Cell Count 10/26/2021 4 50     Hemoglobin 10/26/2021 13 8     HCT 10/26/2021 41 5     MCV 10/26/2021 92 2     MCH 10/26/2021 30 7     MCHC 10/26/2021 33 3     RDW 10/26/2021 11 7     Platelet Count 50/42/0136 227     SL AMB MPV 10/26/2021 10 7     Neutrophils (Absolute) 10/26/2021 3,732     Lymphocytes (Absolute) 10/26/2021 1,614     Monocytes (Absolute) 10/26/2021 540     Eosinophils (Absolute) 10/26/2021 72     Basophils ABS 10/26/2021 42     Neutrophils 10/26/2021 62 2     Lymphocytes 10/26/2021 26 9     Monocytes 10/26/2021 9 0     Eosinophils 10/26/2021 1 2     Basophils PCT 10/26/2021 0 7     TSH 10/26/2021 2 45     Vitamin D, 25-Hydroxy, S* 10/26/2021 40     Prostate Specific Antige* 10/26/2021 0 6     PSA, Free 10/26/2021 0 2     PSA, Free Pct 10/26/2021 33     Hemoglobin A1C 10/26/2021 5 4      Imaging: No results found  Review of Systems:  Review of Systems   Respiratory: Negative for shortness of breath, wheezing and stridor  Cardiovascular: Negative for chest pain, palpitations and leg swelling  Neurological: Negative for dizziness and syncope  Psychiatric/Behavioral: Negative for self-injury  The patient is not nervous/anxious  Physical Exam:  Physical Exam  Vitals reviewed  Constitutional:       General: He is not in acute distress  Appearance: Normal appearance  He is normal weight  He is not ill-appearing, toxic-appearing or diaphoretic  Neck:      Vascular: No carotid bruit  Cardiovascular:      Rate and Rhythm: Normal rate and regular rhythm  Pulses: Normal pulses  Heart sounds: Normal heart sounds  No murmur heard  No friction rub  No gallop  Pulmonary:      Effort: Pulmonary effort is normal  No respiratory distress  Breath sounds: Normal breath sounds  No stridor  No wheezing, rhonchi or rales  Musculoskeletal:      Right lower leg: No edema  Left lower leg: No edema  Neurological:      Mental Status: He is alert  Psychiatric:         Mood and Affect: Mood normal          Discussion/Summary:  Atrial para systole clinically stable, no sustained tachyarrhythmias  Holter monitor 4 years ago revealed normal sinus rhythm at 37% atrial persistently burden, echocardiogram 2 years ago revealed normal left systolic function with mild left atrial enlargement mild mitral and tricuspid insufficiency with estimated normal pulmonary pressures suggested by Doppler criteria  Overall clinically stable, blood pressure is elevated today with some element of white coat hypertension, he will do ambulatory blood pressure and call me if there are elevated  No changes medications for the time being  Stress test years ago questionable inferior ischemia, stress test 4 years ago borderline EKG changes but no symptoms or clear-cut ischemia favor no interventions  This note was completed in part utilizing [employer] direct voice recognition software  Grammatical errors, random word insertion, spelling mistakes, and incomplete sentences may be an occasional consequence of the system secondary to software limitations, ambient noise and hardware issues  At the time of dictation, efforts were made to edit, clarify and /or correct errors  Please read the chart carefully and recognize, using context, where substitutions have occurred  If you have any questions or concerns about the context, text or information contained within the body of this dictation, please contact myself, the provider, for further clarification

## 2022-03-09 DIAGNOSIS — I10 HYPERTENSION, ESSENTIAL, BENIGN: ICD-10-CM

## 2022-03-09 RX ORDER — LISINOPRIL 10 MG/1
10 TABLET ORAL DAILY
Qty: 90 TABLET | Refills: 1 | Status: SHIPPED | OUTPATIENT
Start: 2022-03-09

## 2022-04-13 DIAGNOSIS — I10 HYPERTENSION, UNSPECIFIED TYPE: ICD-10-CM

## 2022-05-04 LAB
ALBUMIN SERPL-MCNC: 4.3 G/DL (ref 3.6–5.1)
ALBUMIN/GLOB SERPL: 1.7 (CALC) (ref 1–2.5)
ALP SERPL-CCNC: 79 U/L (ref 35–144)
ALT SERPL-CCNC: 13 U/L (ref 9–46)
AST SERPL-CCNC: 27 U/L (ref 10–35)
BILIRUB SERPL-MCNC: 0.9 MG/DL (ref 0.2–1.2)
BUN SERPL-MCNC: 25 MG/DL (ref 7–25)
BUN/CREAT SERPL: NORMAL (CALC) (ref 6–22)
CALCIUM SERPL-MCNC: 8.9 MG/DL (ref 8.6–10.3)
CHLORIDE SERPL-SCNC: 105 MMOL/L (ref 98–110)
CO2 SERPL-SCNC: 31 MMOL/L (ref 20–32)
CREAT SERPL-MCNC: 1.05 MG/DL (ref 0.7–1.18)
GLOBULIN SER CALC-MCNC: 2.6 G/DL (CALC) (ref 1.9–3.7)
GLUCOSE SERPL-MCNC: 87 MG/DL (ref 65–99)
HCV AB S/CO SERPL IA: 0.07
HCV AB SERPL QL IA: NORMAL
LDLC SERPL DIRECT ASSAY-MCNC: 110 MG/DL
POTASSIUM SERPL-SCNC: 4.4 MMOL/L (ref 3.5–5.3)
PROT SERPL-MCNC: 6.9 G/DL (ref 6.1–8.1)
SL AMB EGFR AFRICAN AMERICAN: 79 ML/MIN/1.73M2
SL AMB EGFR NON AFRICAN AMERICAN: 68 ML/MIN/1.73M2
SODIUM SERPL-SCNC: 142 MMOL/L (ref 135–146)

## 2022-05-25 ENCOUNTER — OFFICE VISIT (OUTPATIENT)
Dept: INTERNAL MEDICINE CLINIC | Facility: CLINIC | Age: 77
End: 2022-05-25
Payer: MEDICARE

## 2022-05-25 VITALS
WEIGHT: 173 LBS | DIASTOLIC BLOOD PRESSURE: 80 MMHG | HEIGHT: 69 IN | BODY MASS INDEX: 25.62 KG/M2 | OXYGEN SATURATION: 98 % | TEMPERATURE: 97.2 F | HEART RATE: 51 BPM | SYSTOLIC BLOOD PRESSURE: 154 MMHG

## 2022-05-25 DIAGNOSIS — I21.A9 OTHER MYOCARDIAL INFARCTION TYPE (HCC): ICD-10-CM

## 2022-05-25 DIAGNOSIS — I25.10 CORONARY ARTERY DISEASE INVOLVING NATIVE CORONARY ARTERY OF NATIVE HEART WITHOUT ANGINA PECTORIS: ICD-10-CM

## 2022-05-25 DIAGNOSIS — I10 HYPERTENSION, ESSENTIAL, BENIGN: Primary | ICD-10-CM

## 2022-05-25 DIAGNOSIS — E78.00 HYPERCHOLESTEROLEMIA: ICD-10-CM

## 2022-05-25 DIAGNOSIS — N40.0 BENIGN PROSTATIC HYPERPLASIA WITHOUT LOWER URINARY TRACT SYMPTOMS: ICD-10-CM

## 2022-05-25 PROCEDURE — 99214 OFFICE O/P EST MOD 30 MIN: CPT | Performed by: INTERNAL MEDICINE

## 2022-05-25 RX ORDER — LOVASTATIN 20 MG/1
20 TABLET ORAL DAILY
Qty: 90 TABLET | Refills: 1 | Status: SHIPPED | OUTPATIENT
Start: 2022-05-25

## 2022-05-25 NOTE — ASSESSMENT & PLAN NOTE
Blood pressure readings at the house usually range between 453-600 systolic  He has been known to have white coat syndrome without high systolic pressure during his office visits  Systolic blood pressure today was 154  Continue present medication regimen and lifestyle  No change recommended at this time

## 2022-05-25 NOTE — PROGRESS NOTES
Assessment/Plan:    Hypertension, essential, benign  Blood pressure readings at the house usually range between 332-454 systolic  He has been known to have white coat syndrome without high systolic pressure during his office visits  Systolic blood pressure today was 154  Continue present medication regimen and lifestyle  No change recommended at this time  Hypercholesterolemia  ASCVD risk of 38 6% because of elevated LDL C  Patient is presently on Mevacor 20 mg daily  Recommended increased to 40 mg a day  Patient would like to consider this reluctant to change medication regimen at this time  Reviewed HDL profile from previous blood work  Due to lifestyle and previous blood work of HDL being high it indicates some of the ASCVD risk however still is at 38 6%  Patient would like to think about it at this time no change in medication made today  Subjective:   Chief Complaint   Patient presents with    Follow-up     Review labs done 5/4   [facility] Screening     Depression screen fall risk        Patient ID: Saba Marquez is a 67 y o  male  HPI     Will history significant for coronary artery disease, hypercholesterolemia, hypertension here for routine follow-up visit  Feels well has no problems at the present time  Compliant with his medication leads a great lifestyle very active mows his lawn regularly  Denies any chest pain shortness of breath  Weight has been fairly constant  Appetite is good  Has no trouble with bladder a bowel movement  Vision is good, has not had changes glasses for the last 10 years  Walks well and has no problems except for occasional pain in his knees  The following portions of the patient's history were reviewed and updated as appropriate: past family history, past medical history, past social history, past surgical history and problem list     Review of Systems   Eyes:        Uses glasses   Cardiovascular: Negative for chest pain  All other systems reviewed and are negative          Past Medical History:   Diagnosis Date    Abnormal stress test 11/21/2019    Atherosclerosis of native coronary artery without angina pectoris     Glaucoma     Hypertension     Immunization refused     Mixed hyperlipidemia          Current Outpatient Medications:     aspirin 81 MG tablet, Take 1 tablet by mouth daily, Disp: , Rfl:     lisinopril (ZESTRIL) 10 mg tablet, Take 1 tablet (10 mg total) by mouth daily, Disp: 90 tablet, Rfl: 1    lovastatin (MEVACOR) 20 mg tablet, Take 1 tablet (20 mg total) by mouth daily, Disp: 90 tablet, Rfl: 1    metoprolol tartrate (LOPRESSOR) 25 mg tablet, Take 1 tablet (25 mg total) by mouth 2 (two) times a day, Disp: 180 tablet, Rfl: 1    Multiple Vitamin (DAILY VALUE MULTIVITAMIN) TABS, Take by mouth, Disp: , Rfl:     No Known Allergies    Social History   Past Surgical History:   Procedure Laterality Date    COLONOSCOPY  2018     Family History   Problem Relation Age of Onset    Hypertension Mother     Hypertension Father     Heart disease Father     Cancer Father        Objective:  /80 (BP Location: Left arm, Patient Position: Sitting, Cuff Size: Standard)   Pulse (!) 51   Temp (!) 97 2 °F (36 2 °C) (Temporal)   Ht 5' 9 09" (1 755 m)   Wt 78 5 kg (173 lb)   SpO2 98%   BMI 25 48 kg/m²     Recent Results (from the past 1344 hour(s))   LDL cholesterol, direct    Collection Time: 05/04/22  9:00 AM   Result Value Ref Range    LDL Direct 110 (H) <100 mg/dL   Comprehensive metabolic panel    Collection Time: 05/04/22  9:00 AM   Result Value Ref Range    Glucose, Random 87 65 - 99 mg/dL    BUN 25 7 - 25 mg/dL    Creatinine 1 05 0 70 - 1 18 mg/dL    eGFR Non  68 > OR = 60 mL/min/1 73m2    eGFR  79 > OR = 60 mL/min/1 73m2    SL AMB BUN/CREATININE RATIO NOT APPLICABLE 6 - 22 (calc)    Sodium 142 135 - 146 mmol/L    Potassium 4 4 3 5 - 5 3 mmol/L    Chloride 105 98 - 110 mmol/L CO2 31 20 - 32 mmol/L    Calcium 8 9 8 6 - 10 3 mg/dL    Protein, Total 6 9 6 1 - 8 1 g/dL    Albumin 4 3 3 6 - 5 1 g/dL    Globulin 2 6 1 9 - 3 7 g/dL (calc)    Albumin/Globulin Ratio 1 7 1 0 - 2 5 (calc)    TOTAL BILIRUBIN 0 9 0 2 - 1 2 mg/dL    Alkaline Phosphatase 79 35 - 144 U/L    AST 27 10 - 35 U/L    ALT 13 9 - 46 U/L   Hepatitis C Ab W/Refl To HCV RNA, Qn, PCR    Collection Time: 05/04/22  9:00 AM   Result Value Ref Range    HEP C AB NON-REACTIVE NON-REACTIVE    Signal to Cut-Off 0 07 <1 00            Physical Exam        Gen: NAD  Heent: atraumatic, normocephalic  Mouth: is moist  Neck: is supple, no JVD, no carotid bruits     Heart: is regular Normal s1, s2,  Lungs: are clear to auscultation  Abd: soft, non tender, NABS  Ext: no edema, distal pulses normal  Skin: no rashes, ulcers  Mood: normal affect  Neuro: AAOx3

## 2022-05-25 NOTE — ASSESSMENT & PLAN NOTE
ASCVD risk of 38 6% because of elevated LDL C  Patient is presently on Mevacor 20 mg daily  Recommended increased to 40 mg a day  Patient would like to consider this reluctant to change medication regimen at this time  Reviewed HDL profile from previous blood work  Due to lifestyle and previous blood work of HDL being high it indicates some of the ASCVD risk however still is at 38 6%  Patient would like to think about it at this time no change in medication made today

## 2022-09-07 DIAGNOSIS — I10 HYPERTENSION, ESSENTIAL, BENIGN: ICD-10-CM

## 2022-09-07 RX ORDER — LISINOPRIL 10 MG/1
10 TABLET ORAL DAILY
Qty: 90 TABLET | Refills: 1 | Status: SHIPPED | OUTPATIENT
Start: 2022-09-07

## 2022-10-13 DIAGNOSIS — I10 HYPERTENSION, UNSPECIFIED TYPE: ICD-10-CM

## 2022-11-10 LAB
ALBUMIN SERPL-MCNC: 4.3 G/DL (ref 3.6–5.1)
ALBUMIN/GLOB SERPL: 1.6 (CALC) (ref 1–2.5)
ALP SERPL-CCNC: 74 U/L (ref 35–144)
ALT SERPL-CCNC: 16 U/L (ref 9–46)
APPEARANCE UR: CLEAR
AST SERPL-CCNC: 32 U/L (ref 10–35)
BACTERIA UR QL AUTO: ABNORMAL /HPF
BASOPHILS # BLD AUTO: 28 CELLS/UL (ref 0–200)
BASOPHILS NFR BLD AUTO: 0.5 %
BILIRUB SERPL-MCNC: 0.8 MG/DL (ref 0.2–1.2)
BILIRUB UR QL STRIP: NEGATIVE
BUN SERPL-MCNC: 16 MG/DL (ref 7–25)
BUN/CREAT SERPL: NORMAL (CALC) (ref 6–22)
CALCIUM SERPL-MCNC: 9.1 MG/DL (ref 8.6–10.3)
CHLORIDE SERPL-SCNC: 103 MMOL/L (ref 98–110)
CHOLEST SERPL-MCNC: 184 MG/DL
CHOLEST/HDLC SERPL: 2.9 (CALC)
CO2 SERPL-SCNC: 30 MMOL/L (ref 20–32)
COLOR UR: YELLOW
CREAT SERPL-MCNC: 0.85 MG/DL (ref 0.7–1.28)
EOSINOPHIL # BLD AUTO: 118 CELLS/UL (ref 15–500)
EOSINOPHIL NFR BLD AUTO: 2.1 %
ERYTHROCYTE [DISTWIDTH] IN BLOOD BY AUTOMATED COUNT: 11.9 % (ref 11–15)
GFR/BSA.PRED SERPLBLD CYS-BASED-ARV: 89 ML/MIN/1.73M2
GLOBULIN SER CALC-MCNC: 2.7 G/DL (CALC) (ref 1.9–3.7)
GLUCOSE SERPL-MCNC: 92 MG/DL (ref 65–99)
GLUCOSE UR QL STRIP: NEGATIVE
HBA1C MFR BLD: 5.3 % OF TOTAL HGB
HCT VFR BLD AUTO: 40.8 % (ref 38.5–50)
HDLC SERPL-MCNC: 63 MG/DL
HGB BLD-MCNC: 13.7 G/DL (ref 13.2–17.1)
HGB UR QL STRIP: ABNORMAL
HYALINE CASTS #/AREA URNS LPF: ABNORMAL /LPF
KETONES UR QL STRIP: NEGATIVE
LDLC SERPL CALC-MCNC: 105 MG/DL (CALC)
LEUKOCYTE ESTERASE UR QL STRIP: NEGATIVE
LYMPHOCYTES # BLD AUTO: 1568 CELLS/UL (ref 850–3900)
LYMPHOCYTES NFR BLD AUTO: 28 %
MCH RBC QN AUTO: 32 PG (ref 27–33)
MCHC RBC AUTO-ENTMCNC: 33.6 G/DL (ref 32–36)
MCV RBC AUTO: 95.3 FL (ref 80–100)
MONOCYTES # BLD AUTO: 566 CELLS/UL (ref 200–950)
MONOCYTES NFR BLD AUTO: 10.1 %
NEUTROPHILS # BLD AUTO: 3321 CELLS/UL (ref 1500–7800)
NEUTROPHILS NFR BLD AUTO: 59.3 %
NITRITE UR QL STRIP: NEGATIVE
NONHDLC SERPL-MCNC: 121 MG/DL (CALC)
PH UR STRIP: 6.5 [PH] (ref 5–8)
PLATELET # BLD AUTO: 215 THOUSAND/UL (ref 140–400)
PMV BLD REES-ECKER: 10.5 FL (ref 7.5–12.5)
POTASSIUM SERPL-SCNC: 4.1 MMOL/L (ref 3.5–5.3)
PROT SERPL-MCNC: 7 G/DL (ref 6.1–8.1)
PROT UR QL STRIP: ABNORMAL
PSA SERPL-MCNC: 0.7 NG/ML
RBC # BLD AUTO: 4.28 MILLION/UL (ref 4.2–5.8)
RBC #/AREA URNS HPF: ABNORMAL /HPF
SODIUM SERPL-SCNC: 139 MMOL/L (ref 135–146)
SP GR UR STRIP: 1.02 (ref 1–1.03)
SQUAMOUS #/AREA URNS HPF: ABNORMAL /HPF
TRIGL SERPL-MCNC: 71 MG/DL
TSH SERPL-ACNC: 2.94 MIU/L (ref 0.4–4.5)
WBC # BLD AUTO: 5.6 THOUSAND/UL (ref 3.8–10.8)
WBC #/AREA URNS HPF: ABNORMAL /HPF

## 2022-11-15 ENCOUNTER — OFFICE VISIT (OUTPATIENT)
Dept: INTERNAL MEDICINE CLINIC | Facility: CLINIC | Age: 77
End: 2022-11-15

## 2022-11-15 VITALS
SYSTOLIC BLOOD PRESSURE: 160 MMHG | WEIGHT: 171.8 LBS | HEIGHT: 69 IN | DIASTOLIC BLOOD PRESSURE: 96 MMHG | BODY MASS INDEX: 25.45 KG/M2 | HEART RATE: 74 BPM | TEMPERATURE: 98.9 F | OXYGEN SATURATION: 98 %

## 2022-11-15 DIAGNOSIS — Z12.11 ENCOUNTER FOR COLORECTAL CANCER SCREENING: ICD-10-CM

## 2022-11-15 DIAGNOSIS — I25.10 CORONARY ARTERY DISEASE INVOLVING NATIVE CORONARY ARTERY OF NATIVE HEART WITHOUT ANGINA PECTORIS: ICD-10-CM

## 2022-11-15 DIAGNOSIS — E78.00 HYPERCHOLESTEROLEMIA: ICD-10-CM

## 2022-11-15 DIAGNOSIS — Z12.12 ENCOUNTER FOR COLORECTAL CANCER SCREENING: ICD-10-CM

## 2022-11-15 DIAGNOSIS — I10 HYPERTENSION, ESSENTIAL, BENIGN: ICD-10-CM

## 2022-11-15 DIAGNOSIS — R21 RASH: Primary | ICD-10-CM

## 2022-11-15 RX ORDER — LISINOPRIL 20 MG/1
20 TABLET ORAL DAILY
Qty: 90 TABLET | Refills: 2 | Status: SHIPPED | OUTPATIENT
Start: 2022-11-15

## 2022-11-15 RX ORDER — LOVASTATIN 40 MG/1
40 TABLET ORAL DAILY
Qty: 90 TABLET | Refills: 1 | Status: SHIPPED | OUTPATIENT
Start: 2022-11-15

## 2022-11-15 NOTE — PROGRESS NOTES
Name: Heather Hobbs      :       MRN: [id number]  Encounter Provider: Aranza Chavira MD  Encounter Date: 11/15/2022   Encounter department: [address] [place] [address]     1  Rash  -     bacitracin-neomycin-polymyxin b-hydrocortisone 1 % ointment; Apply topically 2 (two) times a day    2  Coronary artery disease involving native coronary artery of native heart without angina pectoris    3  Hypertension, essential, benign  -     lisinopril (ZESTRIL) 20 mg tablet; Take 1 tablet (20 mg total) by mouth daily  -      kidney and bladder; Future; Expected date: 11/15/2022    4  Hypercholesterolemia  Assessment & Plan:  LDL C not at goal   Pt should ideally be on a high intensity statin given hx of CAD  Pt reluctant for med change at this time however is agreeable to increasing lovastatin to 40 mg a day at this time  Will recheck blood work at next visit to look for adequacy of treatment if necessary with can go back down to 20 mg  However target for LDL C is 60    Orders:  -     lovastatin (MEVACOR) 40 MG tablet; Take 1 tablet (40 mg total) by mouth daily    5  Encounter for colorectal cancer screening  -     Ambulatory referral for colonoscopy; Future           Subjective     HPI     Today for follow-up visit for hypertension and mild hyperlipidemia overall has been doing well  Over the winter did get the COVID infection which lasted for about 2 weeks the not get himself tested however did not have any chest congestion had a low-grade fever which lasted to 2 week time and over time he just got better on his own  He has been keeping active and uses a push move over to cut the grass in his yd which is about and acre or more a  He gets a rash around his ankles in the socket area usually around the summer assuming that is the area that he sweats  However his left ankle tends to get more red and erythematous when he goes to a certain spot in his yd    In all likelihood there is something that causes it to get more infected when he visits that area  On a positive note to COVID really did not affect him in any long-term way and he feels back to his baseline self  He did has note noticed however that his blood pressure did go up when he had the infection with the COVID virus almost to as high as 258 systolic  Since then it is not as high as it was before however does notice that it is higher than it has been in the past       Review of Systems   HENT: Positive for hearing loss  Allergic/Immunologic: Positive for environmental allergies  All other systems reviewed and are negative  Past Medical History:   Diagnosis Date   • Abnormal stress test 11/21/2019   • Atherosclerosis of native coronary artery without angina pectoris    • Glaucoma    • Hypertension    • Immunization refused    • Mixed hyperlipidemia      Past Surgical History:   Procedure Laterality Date   • COLONOSCOPY  2018     Family History   Problem Relation Age of Onset   • Hypertension Mother    • Hypertension Father    • Heart disease Father    • Cancer Father      Social History     Socioeconomic History   • Marital status: /Civil Union     Spouse name: None   • Number of children: None   • Years of education: None   • Highest education level: None   Occupational History   • None   Tobacco Use   • Smoking status: Never Smoker   • Smokeless tobacco: Never Used   Vaping Use   • Vaping Use: Never used   Substance and Sexual Activity   • Alcohol use:  Yes   • Drug use: Never   • Sexual activity: None   Other Topics Concern   • None   Social History Narrative   • None     Social Determinants of Health     Financial Resource Strain: Not on file   Food Insecurity: Not on file   Transportation Needs: Not on file   Physical Activity: Not on file   Stress: Not on file   Social Connections: Not on file   Intimate Partner Violence: Not on file   Housing Stability: Not on file     Current Outpatient Medications on File Prior to Visit   Medication Sig   • aspirin 81 MG tablet Take 1 tablet by mouth daily   • metoprolol tartrate (LOPRESSOR) 25 mg tablet Take 1 tablet (25 mg total) by mouth 2 (two) times a day   • Multiple Vitamin (DAILY VALUE MULTIVITAMIN) TABS Take by mouth   • [DISCONTINUED] lisinopril (ZESTRIL) 10 mg tablet Take 1 tablet (10 mg total) by mouth daily   • [DISCONTINUED] lovastatin (MEVACOR) 20 mg tablet Take 1 tablet (20 mg total) by mouth daily     No Known Allergies  Immunization History   Administered Date(s) Administered   • COVID-19 PFIZER VACCINE 0 3 ML IM 02/12/2021, 03/05/2021   • Tdap 11/07/2017, 11/07/2017       Objective     /96 (BP Location: Left arm, Patient Position: Sitting, Cuff Size: Standard)   Pulse 74   Temp 98 9 °F (37 2 °C) (Temporal)   Ht 5' 8 58" (1 742 m)   Wt 77 9 kg (171 lb 12 8 oz)   SpO2 98%   BMI 25 68 kg/m²     Physical Exam     Gen: NAD  Heent: atraumatic, normocephalic  Mouth: is moist  Neck: is supple, no JVD, no carotid bruits     Heart: is regular Normal s1, s2,  Lungs: are clear to auscultation  Abd: soft, non tender, NABS  Ext: no edema, distal pulses normal  Skin: erythematous rash over both ankles L >R  Mood: normal affect  Neuro: AAOx3  Anny Rosado MD

## 2022-11-15 NOTE — ASSESSMENT & PLAN NOTE
LDL C not at goal   Pt should ideally be on a high intensity statin given hx of CAD  Pt reluctant for med change at this time however is agreeable to increasing lovastatin to 40 mg a day at this time  Will recheck blood work at next visit to look for adequacy of treatment if necessary with can go back down to 20 mg    However target for LDL C is 60

## 2022-11-16 ENCOUNTER — TELEPHONE (OUTPATIENT)
Dept: INTERNAL MEDICINE CLINIC | Age: 77
End: 2022-11-16

## 2022-11-16 NOTE — TELEPHONE ENCOUNTER
Can we please change PcP to Dr Gurvinder Black  Patient already saw her twice and the next appt is with her       Thank you

## 2022-12-16 ENCOUNTER — TELEPHONE (OUTPATIENT)
Dept: INTERNAL MEDICINE CLINIC | Facility: CLINIC | Age: 77
End: 2022-12-16

## 2022-12-16 ENCOUNTER — OFFICE VISIT (OUTPATIENT)
Dept: URGENT CARE | Age: 77
End: 2022-12-16

## 2022-12-16 VITALS
WEIGHT: 170 LBS | SYSTOLIC BLOOD PRESSURE: 190 MMHG | OXYGEN SATURATION: 97 % | HEIGHT: 69 IN | RESPIRATION RATE: 18 BRPM | TEMPERATURE: 97 F | BODY MASS INDEX: 25.18 KG/M2 | DIASTOLIC BLOOD PRESSURE: 80 MMHG | HEART RATE: 71 BPM

## 2022-12-16 DIAGNOSIS — L01.00 IMPETIGO: Primary | ICD-10-CM

## 2022-12-16 RX ORDER — CEPHALEXIN 500 MG/1
500 CAPSULE ORAL EVERY 12 HOURS SCHEDULED
Qty: 14 CAPSULE | Refills: 0 | Status: SHIPPED | OUTPATIENT
Start: 2022-12-16 | End: 2022-12-23

## 2022-12-16 NOTE — PROGRESS NOTES
[facility]        NAME: Indra Leonard is a 65 y o  male  : 1945    MRN: [id number]  DATE: 2022  TIME: 4:28 PM    Assessment and Plan   Impetigo [L01 00]  1  Impetigo  cephalexin (KEFLEX) 500 mg capsule        Patient presents for eval of rash to left lower foot and ankle  States was seen by PCP and advised to use abx ointment which he has been  States it is not helping  Assessment notes honey colored crusting to left ankle  Patient states at times there are fluid filled blisters and he keeps covered with paper towel in sock to contain drainage  Discussed would treat as an impetigo with oral abx however should follow up with PCP  Patient agreeable  No swelling, bruising noted  Patient Instructions       Follow up with PCP  As needed    Chief Complaint     Chief Complaint   Patient presents with   • Rash     Rash on left ankle started about a month ago  PT saw his family doctor and was advised to start and OTC ointment but it does not seem to be helping          History of Present Illness       Patient presents for eval of rash to left lower foot and ankle  States was seen by PCP and advised to use abx ointment which he has been  States it is not helping  Assessment notes honey colored crusting to left ankle  Patient states at times there are fluid filled blisters and he keeps covered with paper towel in sock to contain drainage  Discussed would treat as an impetigo with oral abx however should follow up with PCP  Patient agreeable  No swelling, bruising noted  Review of Systems   Review of Systems   Constitutional: Negative for chills and fever  HENT: Negative for congestion, ear pain, postnasal drip, sinus pain and sore throat  Eyes: Negative for pain and itching  Respiratory: Negative for cough, shortness of breath and wheezing  Cardiovascular: Negative for chest pain and palpitations     Gastrointestinal: Negative for abdominal pain, constipation, diarrhea, nausea and vomiting  Genitourinary: Negative for difficulty urinating and hematuria  Musculoskeletal: Negative for arthralgias and myalgias  Skin: Positive for rash  Neurological: Negative for dizziness, light-headedness and headaches  Psychiatric/Behavioral: Negative for agitation and sleep disturbance  The patient is not nervous/anxious            Current Medications       Current Outpatient Medications:   •  aspirin 81 MG tablet, Take 1 tablet by mouth daily, Disp: , Rfl:   •  cephalexin (KEFLEX) 500 mg capsule, Take 1 capsule (500 mg total) by mouth every 12 (twelve) hours for 7 days, Disp: 14 capsule, Rfl: 0  •  lisinopril (ZESTRIL) 20 mg tablet, Take 1 tablet (20 mg total) by mouth daily, Disp: 90 tablet, Rfl: 2  •  lovastatin (MEVACOR) 40 MG tablet, Take 1 tablet (40 mg total) by mouth daily, Disp: 90 tablet, Rfl: 1  •  metoprolol tartrate (LOPRESSOR) 25 mg tablet, Take 1 tablet (25 mg total) by mouth 2 (two) times a day, Disp: 180 tablet, Rfl: 1  •  bacitracin-neomycin-polymyxin b-hydrocortisone 1 % ointment, Apply topically 2 (two) times a day, Disp: 15 g, Rfl: 0  •  Multiple Vitamin (DAILY VALUE MULTIVITAMIN) TABS, Take by mouth, Disp: , Rfl:     Current Allergies     Allergies as of 12/16/2022   • (No Known Allergies)            The following portions of the patient's history were reviewed and updated as appropriate: allergies, current medications, past family history, past medical history, past social history, past surgical history and problem list      Past Medical History:   Diagnosis Date   • Abnormal stress test 11/21/2019   • Atherosclerosis of native coronary artery without angina pectoris    • Glaucoma    • Hypertension    • Immunization refused    • Mixed hyperlipidemia        Past Surgical History:   Procedure Laterality Date   • COLONOSCOPY  2018       Family History   Problem Relation Age of Onset   • Hypertension Mother    • Hypertension Father    • Heart disease Father • Cancer Father          Medications have been verified  Objective   BP (!) 190/80   Pulse 71   Temp (!) 97 °F (36 1 °C)   Resp 18   Ht 5' 9" (1 753 m)   Wt 77 1 kg (170 lb)   SpO2 97%   BMI 25 10 kg/m²   No LMP for male patient  Physical Exam     Physical Exam  Vitals reviewed  Constitutional:       General: He is not in acute distress  Appearance: Normal appearance  He is not ill-appearing  HENT:      Head: Normocephalic and atraumatic  Eyes:      Extraocular Movements: Extraocular movements intact  Conjunctiva/sclera: Conjunctivae normal    Pulmonary:      Effort: Pulmonary effort is normal    Skin:     General: Skin is warm  Findings: Rash present  Rash is crusting  Neurological:      General: No focal deficit present  Mental Status: He is alert     Psychiatric:         Mood and Affect: Mood normal          Behavior: Behavior normal          Judgment: Judgment normal

## 2022-12-27 ENCOUNTER — TELEPHONE (OUTPATIENT)
Dept: INTERNAL MEDICINE CLINIC | Facility: CLINIC | Age: 77
End: 2022-12-27

## 2022-12-27 NOTE — TELEPHONE ENCOUNTER
Patient seen on 12/16/22 for a rash on his lower L foot and ankle  He was prescribed rx cephalexin (KEFLEX) 500 mg capsule, and since finishing ABX  The rash has now come back  He would like to know if whether he can come in for an appointment with you? If something can be sent for him?  Or should he be reffered to a specialist

## 2022-12-28 ENCOUNTER — OFFICE VISIT (OUTPATIENT)
Dept: INTERNAL MEDICINE CLINIC | Facility: CLINIC | Age: 77
End: 2022-12-28

## 2022-12-28 VITALS
TEMPERATURE: 98.9 F | WEIGHT: 171.8 LBS | DIASTOLIC BLOOD PRESSURE: 94 MMHG | BODY MASS INDEX: 25.45 KG/M2 | SYSTOLIC BLOOD PRESSURE: 178 MMHG | HEIGHT: 69 IN | HEART RATE: 57 BPM | OXYGEN SATURATION: 98 %

## 2022-12-28 DIAGNOSIS — L03.116 CELLULITIS OF LEFT LOWER EXTREMITY: Primary | ICD-10-CM

## 2022-12-28 DIAGNOSIS — R21 RASH: ICD-10-CM

## 2022-12-28 RX ORDER — CEPHALEXIN 500 MG/1
500 CAPSULE ORAL EVERY 6 HOURS SCHEDULED
Qty: 40 CAPSULE | Refills: 0 | Status: SHIPPED | OUTPATIENT
Start: 2022-12-28 | End: 2023-01-07

## 2022-12-28 NOTE — PROGRESS NOTES
Name: Brigida Johnson      : [birth year]      MRN: [id number]  Encounter Provider: Krista Laboy MD  Encounter Date: 2022   Encounter department: [address] [place] [address]     1  Cellulitis of left lower extremity  Assessment & Plan:  Leg elevation, pressure and bandage  Antibiotics as discussed  Topical as discussed    Orders:  -     cephalexin (KEFLEX) 500 mg capsule; Take 1 capsule (500 mg total) by mouth every 6 (six) hours for 10 days    2  Rash           Subjective      Chief Complaint   Patient presents with   • Follow-up     Patient has rash and swollen left lower leg went urgent care on  put on cephalexin seven day course still has rash and now spreading up left leg also has rash above knee that has been there 2 month   • Health Screening     Awv to be scheduled at next appointment per Dr Nataly Flowers colonoscopy due in Ld patient has postponed due to issue going on with leg will schedule when better     Rash  This is a new problem  The current episode started 1 to 4 weeks ago  Progression since onset: improved and worsened again  The affected locations include the left lower leg and left ankle  The problem is moderate  The rash is characterized by redness, swelling and draining  It is unknown if there was an exposure to a precipitant  The rash first occurred at home  Past treatments include antibiotics  The treatment provided moderate relief  Review of Systems   Skin: Positive for rash         Current Outpatient Medications on File Prior to Visit   Medication Sig   • aspirin 81 MG tablet Take 1 tablet by mouth daily   • lisinopril (ZESTRIL) 20 mg tablet Take 1 tablet (20 mg total) by mouth daily   • lovastatin (MEVACOR) 40 MG tablet Take 1 tablet (40 mg total) by mouth daily   • metoprolol tartrate (LOPRESSOR) 25 mg tablet Take 1 tablet (25 mg total) by mouth 2 (two) times a day   • Multiple Vitamin (DAILY VALUE MULTIVITAMIN) TABS Take by mouth Objective     BP (!) 178/94 (BP Location: Left arm, Patient Position: Sitting, Cuff Size: Standard)   Pulse 57   Temp 98 9 °F (37 2 °C) (Temporal)   Ht 5' 8 7" (1 745 m) Comment: without shoes  Wt 77 9 kg (171 lb 12 8 oz)   SpO2 98%   BMI 25 59 kg/m²     Physical Exam  Skin:     Findings: Erythema and rash present  Rash is macular and pustular  Gen: NAD  Heent: atraumatic, normocephalic  Mouth: is moist  Neck: is supple, no JVD, no carotid bruits     Heart: is regular Normal s1, s2,  Lungs: are clear to auscultation    Ext:  1 + edema, distal pulses normal  Skin: no rashes, ulcers  Mood: normal affect  Neuro: AAOx3  Derrick Adorno MD

## 2023-01-11 ENCOUNTER — OFFICE VISIT (OUTPATIENT)
Dept: INTERNAL MEDICINE CLINIC | Facility: CLINIC | Age: 78
End: 2023-01-11

## 2023-01-11 VITALS
HEIGHT: 70 IN | DIASTOLIC BLOOD PRESSURE: 90 MMHG | TEMPERATURE: 98.4 F | SYSTOLIC BLOOD PRESSURE: 190 MMHG | BODY MASS INDEX: 25.28 KG/M2 | HEART RATE: 63 BPM | WEIGHT: 176.6 LBS | OXYGEN SATURATION: 99 %

## 2023-01-11 DIAGNOSIS — L03.116 CELLULITIS OF LEFT LOWER EXTREMITY: ICD-10-CM

## 2023-01-11 DIAGNOSIS — I10 HYPERTENSION, ESSENTIAL, BENIGN: ICD-10-CM

## 2023-01-11 DIAGNOSIS — Z00.00 MEDICARE ANNUAL WELLNESS VISIT, SUBSEQUENT: Primary | ICD-10-CM

## 2023-01-11 RX ORDER — LISINOPRIL 40 MG/1
40 TABLET ORAL DAILY
Qty: 90 TABLET | Refills: 1 | Status: SHIPPED | OUTPATIENT
Start: 2023-01-11

## 2023-01-11 RX ORDER — AMOXICILLIN 875 MG/1
875 TABLET, COATED ORAL 2 TIMES DAILY
Qty: 20 TABLET | Refills: 0 | Status: SHIPPED | OUTPATIENT
Start: 2023-01-11 | End: 2023-01-21

## 2023-01-11 NOTE — PROGRESS NOTES
Assessment and Plan:     Problem List Items Addressed This Visit        Cardiovascular and Mediastinum    Hypertension, essential, benign     Increase lisinopril to 40 mg daily continue to monitor blood pressure  Relevant Medications    lisinopril (ZESTRIL) 40 mg tablet       Other    Cellulitis of left lower extremity     Continue wrapping and elevation of left lower extremity  Use Neosporin with cortisone and it to help with dyshidrotic edema  Also advised elevation along with moistening   So we do a course of amoxicillin for 10 days for treatment of infection         Relevant Medications    neomycin-polymyxin-hydrocortisone (CORTISPORIN) 3 5-33947-3 5 cream    amoxicillin (AMOXIL) 875 mg tablet    Other Relevant Orders    Ambulatory Referral to Dermatology    Medicare annual wellness visit, subsequent - Primary     BMI Counseling: Body mass index is 25 57 kg/m²  The BMI is above normal  Nutrition recommendations include moderation in carbohydrate intake  Rationale for BMI follow-up plan is due to patient being overweight or obese  Depression Screening and Follow-up Plan: Patient was screened for depression during today's encounter  They screened negative with a PHQ-2 score of 0  Preventive health issues were discussed with patient, and age appropriate screening tests were ordered as noted in patient's After Visit Summary  Personalized health advice and appropriate referrals for health education or preventive services given if needed, as noted in patient's After Visit Summary  History of Present Illness:     Patient presents for a Medicare Wellness Visit and follow up    Rash  This is a chronic problem  The current episode started more than 1 month ago  The problem has been gradually improving since onset  The affected locations include the left ankle, left lower leg and left foot  The problem is moderate  The rash is characterized by dryness, itchiness, peeling, scaling and redness   It is unknown if there was an exposure to a precipitant  Associated symptoms include itching  Past treatments include antibiotics, cold compress, antibiotic cream, topical steroids and antihistamine  The treatment provided mild relief  Hypertension  This is a chronic problem  The current episode started more than 1 year ago  The problem has been gradually improving since onset  The problem is uncontrolled  There are no associated agents to hypertension  Past treatments include angiotensin blockers and beta blockers  The current treatment provides moderate improvement  There are no compliance problems  Patient Care Team:  Jamie Arriola MD as PCP - General (Internal Medicine)  Tori Foss MD     Review of Systems:     Review of Systems   Skin: Positive for itching and rash  Psychiatric/Behavioral:        Upset because of the rash on lower extremity   All other systems reviewed and are negative         Problem List:     Patient Active Problem List   Diagnosis   • Hypercholesterolemia   • Hypertension, essential, benign   • CAD (coronary artery disease)   • PAC (premature atrial contraction)   • Cellulitis of left lower extremity   • Medicare annual wellness visit, subsequent      Past Medical and Surgical History:     Past Medical History:   Diagnosis Date   • Abnormal stress test 11/21/2019   • Atherosclerosis of native coronary artery without angina pectoris    • Glaucoma    • Hypertension    • Immunization refused    • Mixed hyperlipidemia      Past Surgical History:   Procedure Laterality Date   • COLONOSCOPY  2018      Family History:     Family History   Problem Relation Age of Onset   • Hypertension Mother    • Hypertension Father    • Heart disease Father    • Cancer Father       Social History:     Social History     Socioeconomic History   • Marital status: /Civil Union     Spouse name: None   • Number of children: None   • Years of education: None   • Highest education level: None   Occupational History   • None   Tobacco Use   • Smoking status: Never   • Smokeless tobacco: Never   Vaping Use   • Vaping Use: Never used   Substance and Sexual Activity   • Alcohol use: Yes   • Drug use: Never   • Sexual activity: None   Other Topics Concern   • None   Social History Narrative   • None     Social Determinants of Health     Financial Resource Strain: Low Risk    • Difficulty of Paying Living Expenses: Not hard at all   Food Insecurity: Not on file   Transportation Needs: No Transportation Needs   • Lack of Transportation (Medical): No   • Lack of Transportation (Non-Medical): No   Physical Activity: Not on file   Stress: Not on file   Social Connections: Not on file   Intimate Partner Violence: Not on file   Housing Stability: Not on file      Medications and Allergies:     Current Outpatient Medications   Medication Sig Dispense Refill   • amoxicillin (AMOXIL) 875 mg tablet Take 1 tablet (875 mg total) by mouth 2 (two) times a day for 10 days 20 tablet 0   • aspirin 81 MG tablet Take 1 tablet by mouth daily     • lisinopril (ZESTRIL) 40 mg tablet Take 1 tablet (40 mg total) by mouth daily 90 tablet 1   • lovastatin (MEVACOR) 40 MG tablet Take 1 tablet (40 mg total) by mouth daily 90 tablet 1   • metoprolol tartrate (LOPRESSOR) 25 mg tablet Take 1 tablet (25 mg total) by mouth 2 (two) times a day 180 tablet 1   • Multiple Vitamin (DAILY VALUE MULTIVITAMIN) TABS Take by mouth     • neomycin-polymyxin-hydrocortisone (CORTISPORIN) 3 5-05133-8 5 cream Apply topically 2 (two) times a day 30 g 0     No current facility-administered medications for this visit       No Known Allergies   Immunizations:     Immunization History   Administered Date(s) Administered   • COVID-19 PFIZER VACCINE 0 3 ML IM 02/12/2021, 03/05/2021   • Tdap 11/07/2017, 11/07/2017      Health Maintenance:         Topic Date Due   • Colorectal Cancer Screening  01/17/2023   • Hepatitis C Screening  Completed         Topic Date Due   • Pneumococcal Vaccine: 65+ Years (1 - PCV) Never done   • COVID-19 Vaccine (3 - Booster for Pfizer series) 04/30/2021   • Influenza Vaccine (1) Never done      Medicare Screening Tests and Risk Assessments:     Brandan Wolf is here for his Subsequent Wellness visit  Last Medicare Wellness visit information reviewed, patient interviewed and updates made to the record today  Health Risk Assessment:   Patient rates overall health as very good  Patient feels that their physical health rating is same  Patient is satisfied with their life  Eyesight was rated as same  Hearing was rated as same  Patient feels that their emotional and mental health rating is same  Patients states they are never, rarely angry  Patient states they are never, rarely unusually tired/fatigued  Pain experienced in the last 7 days has been none  Patient states that he has experienced no weight loss or gain in last 6 months  Depression Screening:   PHQ-2 Score: 0      Fall Risk Screening: In the past year, patient has experienced: no history of falling in past year      Home Safety:  Patient does not have trouble with stairs inside or outside of their home  Patient has working smoke alarms and has working carbon monoxide detector  Home safety hazards include: none  Nutrition:   Current diet is Regular and Limited junk food  Medications:   Patient is currently taking over-the-counter supplements  OTC medications include: see medication list  Patient is able to manage medications  Activities of Daily Living (ADLs)/Instrumental Activities of Daily Living (IADLs):   Walk and transfer into and out of bed and chair?: Yes  Dress and groom yourself?: Yes    Bathe or shower yourself?: Yes    Feed yourself?  Yes  Do your laundry/housekeeping?: Yes  Manage your money, pay your bills and track your expenses?: Yes  Make your own meals?: Yes    Do your own shopping?: Yes    Previous Hospitalizations:   Any hospitalizations or ED visits within the last 12 months?: No      Advance Care Planning:   Living will: Yes    Advanced directive: Yes    Five wishes given: Yes      PREVENTIVE SCREENINGS      Cardiovascular Screening:    General: Screening Not Indicated, History Lipid Disorder and Screening Current      Diabetes Screening:     General: Screening Current      Colorectal Cancer Screening:     General: Screening Current      Prostate Cancer Screening:    General: Screening Not Indicated and Screening Current      Abdominal Aortic Aneurysm (AAA) Screening:        General: Screening Not Indicated      Lung Cancer Screening:     General: Screening Not Indicated      Hepatitis C Screening:    General: Screening Current    Screening, Brief Intervention, and Referral to Treatment (SBIRT)    Screening  Typical number of drinks in a day: 1  Typical number of drinks in a week: 7  Interpretation: Low risk drinking behavior  Other Counseling Topics:   Skin self-exam and calcium and vitamin D intake and regular weightbearing exercise  No results found  Physical Exam:     BP (!) 190/90 (BP Location: Right arm, Patient Position: Sitting, Cuff Size: Standard)   Pulse 63   Temp 98 4 °F (36 9 °C) (Temporal)   Ht 5' 9 69" (1 77 m)   Wt 80 1 kg (176 lb 9 6 oz)   SpO2 99%   BMI 25 57 kg/m²     Gen: NAD  Heent: atraumatic, normocephalic  Mouth: is moist  Neck: is supple, no JVD, no carotid bruits  Heart: is regular Normal s1, s2,  Lungs: are clear to auscultation  Abd: soft, non tender, NABS  Ext: no edema, distal pulses normal  Skin: rash left LE  Mood: normal affect  Neuro: AAOx3    Physical Exam   Lower extremities   Left lower extremity comments: Edema of left L extremity, 1 +  Fine purpiric rash over anterior skin  Itchy, dry skin over top of foot  NO pain            ?     Karla Shaffer MD

## 2023-01-11 NOTE — ASSESSMENT & PLAN NOTE
Continue wrapping and elevation of left lower extremity  Use Neosporin with cortisone and it to help with dyshidrotic edema  Also advised elevation along with moistening     So we do a course of amoxicillin for 10 days for treatment of infection

## 2023-01-11 NOTE — PATIENT INSTRUCTIONS
Medicare Preventive Visit Patient Instructions  Thank you for completing your Welcome to Medicare Visit or Medicare Annual Wellness Visit today  Your next wellness visit will be due in one year (1/12/2024)  The screening/preventive services that you may require over the next 5-10 years are detailed below  Some tests may not apply to you based off risk factors and/or age  Screening tests ordered at today's visit but not completed yet may show as past due  Also, please note that scanned in results may not display below  Preventive Screenings:  Service Recommendations Previous Testing/Comments   Colorectal Cancer Screening  · Colonoscopy    · Fecal Occult Blood Test (FOBT)/Fecal Immunochemical Test (FIT)  · Fecal DNA/Cologuard Test  · Flexible Sigmoidoscopy Age: 39-70 years old   Colonoscopy: every 10 years (May be performed more frequently if at higher risk)  OR  FOBT/FIT: every 1 year  OR  Cologuard: every 3 years  OR  Sigmoidoscopy: every 5 years  Screening may be recommended earlier than age 39 if at higher risk for colorectal cancer  Also, an individualized decision between you and your healthcare provider will decide whether screening between the ages of 74-80 would be appropriate   Colonoscopy: 01/17/2018  FOBT/FIT: Not on file  Cologuard: Not on file  Sigmoidoscopy: Not on file    Screening Current     Prostate Cancer Screening Individualized decision between patient and health care provider in men between ages of 53-78   Medicare will cover every 12 months beginning on the day after your 50th birthday PSA: 0 7 ng/mL     Screening Not Indicated     Hepatitis C Screening Once for adults born between 1945 and 1965  More frequently in patients at high risk for Hepatitis C Hep C Antibody: 05/04/2022    Screening Current   Diabetes Screening 1-2 times per year if you're at risk for diabetes or have pre-diabetes Fasting glucose: No results in last 5 years (No results in last 5 years)  A1C: 5 3 % of total Hgb (11/9/2022)  Screening Current   Cholesterol Screening Once every 5 years if you don't have a lipid disorder  May order more often based on risk factors  Lipid panel: 11/09/2022  Screening Not Indicated  History Lipid Disorder      Other Preventive Screenings Covered by Medicare:  1  Abdominal Aortic Aneurysm (AAA) Screening: covered once if your at risk  You're considered to be at risk if you have a family history of AAA or a male between the age of 73-68 who smoking at least 100 cigarettes in your lifetime  2  Lung Cancer Screening: covers low dose CT scan once per year if you meet all of the following conditions: (1) Age 50-69; (2) No signs or symptoms of lung cancer; (3) Current smoker or have quit smoking within the last 15 years; (4) You have a tobacco smoking history of at least 20 pack years (packs per day x number of years you smoked); (5) You get a written order from a healthcare provider  3  Glaucoma Screening: covered annually if you're considered high risk: (1) You have diabetes OR (2) Family history of glaucoma OR (3)  aged 48 and older OR (3)  American aged 72 and older  3  Osteoporosis Screening: covered every 2 years if you meet one of the following conditions: (1) Have a vertebral abnormality; (2) On glucocorticoid therapy for more than 3 months; (3) Have primary hyperparathyroidism; (4) On osteoporosis medications and need to assess response to drug therapy  5  HIV Screening: covered annually if you're between the age of 12-76  Also covered annually if you are younger than 13 and older than 72 with risk factors for HIV infection  For pregnant patients, it is covered up to 3 times per pregnancy      Immunizations:  Immunization Recommendations   Influenza Vaccine Annual influenza vaccination during flu season is recommended for all persons aged >= 6 months who do not have contraindications   Pneumococcal Vaccine   * Pneumococcal conjugate vaccine = PCV13 (Prevnar 13), PCV15 (Vaxneuvance), PCV20 (Prevnar 20)  * Pneumococcal polysaccharide vaccine = PPSV23 (Pneumovax) Adults 2364 years old: 1-3 doses may be recommended based on certain risk factors  Adults 72 years old: 1-2 doses may be recommended based off what pneumonia vaccine you previously received   Hepatitis B Vaccine 3 dose series if at intermediate or high risk (ex: diabetes, end stage renal disease, liver disease)   Tetanus (Td) Vaccine - COST NOT COVERED BY MEDICARE PART B Following completion of primary series, a booster dose should be given every 10 years to maintain immunity against tetanus  Td may also be given as tetanus wound prophylaxis  Tdap Vaccine - COST NOT COVERED BY MEDICARE PART B Recommended at least once for all adults  For pregnant patients, recommended with each pregnancy  Shingles Vaccine (Shingrix) - COST NOT COVERED BY MEDICARE PART B  2 shot series recommended in those aged 48 and above     Health Maintenance Due:      Topic Date Due   • Colorectal Cancer Screening  01/17/2023   • Hepatitis C Screening  Completed     Immunizations Due:      Topic Date Due   • Pneumococcal Vaccine: 65+ Years (1 - PCV) Never done   • COVID-19 Vaccine (3 - Booster for Pfizer series) 04/30/2021   • Influenza Vaccine (1) Never done     Advance Directives   What are advance directives? Advance directives are legal documents that state your wishes and plans for medical care  These plans are made ahead of time in case you lose your ability to make decisions for yourself  Advance directives can apply to any medical decision, such as the treatments you want, and if you want to donate organs  What are the types of advance directives? There are many types of advance directives, and each state has rules about how to use them  You may choose a combination of any of the following:  · Living will: This is a written record of the treatment you want   You can also choose which treatments you do not want, which to limit, and which to stop at a certain time  This includes surgery, medicine, IV fluid, and tube feedings  · Durable power of  for healthcare [facility]): This is a written record that states who you want to make healthcare choices for you when you are unable to make them for yourself  This person, called a proxy, is usually a family member or a friend  You may choose more than 1 proxy  · Do not resuscitate (DNR) order:  A DNR order is used in case your heart stops beating or you stop breathing  It is a request not to have certain forms of treatment, such as CPR  A DNR order may be included in other types of advance directives  · Medical directive: This covers the care that you want if you are in a coma, near death, or unable to make decisions for yourself  You can list the treatments you want for each condition  Treatment may include pain medicine, surgery, blood transfusions, dialysis, IV or tube feedings, and a ventilator (breathing machine)  · Values history: This document has questions about your views, beliefs, and how you feel and think about life  This information can help others choose the care that you would choose  Why are advance directives important? An advance directive helps you control your care  Although spoken wishes may be used, it is better to have your wishes written down  Spoken wishes can be misunderstood, or not followed  Treatments may be given even if you do not want them  An advance directive may make it easier for your family to make difficult choices about your care  Weight Management   Why it is important to manage your weight:  Being overweight increases your risk of health conditions such as heart disease, high blood pressure, type 2 diabetes, and certain types of cancer  It can also increase your risk for osteoarthritis, sleep apnea, and other respiratory problems  Aim for a slow, steady weight loss   Even a small amount of weight loss can lower your risk of health problems  How to lose weight safely:  A safe and healthy way to lose weight is to eat fewer calories and get regular exercise  You can lose up about 1 pound a week by decreasing the number of calories you eat by 500 calories each day  Healthy meal plan for weight management:  A healthy meal plan includes a variety of foods, contains fewer calories, and helps you stay healthy  A healthy meal plan includes the following:  · Eat whole-grain foods more often  A healthy meal plan should contain fiber  Fiber is the part of grains, fruits, and vegetables that is not broken down by your body  Whole-grain foods are healthy and provide extra fiber in your diet  Some examples of whole-grain foods are whole-wheat breads and pastas, oatmeal, brown rice, and bulgur  · Eat a variety of vegetables every day  Include dark, leafy greens such as spinach, kale, rebecca greens, and mustard greens  Eat yellow and orange vegetables such as carrots, sweet potatoes, and winter squash  · Eat a variety of fruits every day  Choose fresh or canned fruit (canned in its own juice or light syrup) instead of juice  Fruit juice has very little or no fiber  · Eat low-fat dairy foods  Drink fat-free (skim) milk or 1% milk  Eat fat-free yogurt and low-fat cottage cheese  Try low-fat cheeses such as mozzarella and other reduced-fat cheeses  · Choose meat and other protein foods that are low in fat  Choose beans or other legumes such as split peas or lentils  Choose fish, skinless poultry (chicken or turkey), or lean cuts of red meat (beef or pork)  Before you cook meat or poultry, cut off any visible fat  · Use less fat and oil  Try baking foods instead of frying them  Add less fat, such as margarine, sour cream, regular salad dressing and mayonnaise to foods  Eat fewer high-fat foods  Some examples of high-fat foods include french fries, doughnuts, ice cream, and cakes  · Eat fewer sweets    Limit foods and drinks that are high in sugar  This includes candy, cookies, regular soda, and sweetened drinks  Exercise:  Exercise at least 30 minutes per day on most days of the week  Some examples of exercise include walking, biking, dancing, and swimming  You can also fit in more physical activity by taking the stairs instead of the elevator or parking farther away from stores  Ask your healthcare provider about the best exercise plan for you  © Copyright [employer] 2018 Information is for End User's use only and may not be sold, redistributed or otherwise used for commercial purposes   All illustrations and images included in CareNotes® are the copyrighted property of A D A M , [employer]  or [address] [place] [address]

## 2023-01-12 ENCOUNTER — TELEPHONE (OUTPATIENT)
Dept: INTERNAL MEDICINE CLINIC | Facility: CLINIC | Age: 78
End: 2023-01-12

## 2023-01-12 NOTE — TELEPHONE ENCOUNTER
Request For Billing Information From [address] [place] [address] by PCP, Faxed and scanned into patient's chart

## 2023-01-26 ENCOUNTER — TELEPHONE (OUTPATIENT)
Dept: INTERNAL MEDICINE CLINIC | Facility: CLINIC | Age: 78
End: 2023-01-26

## 2023-01-26 ENCOUNTER — OFFICE VISIT (OUTPATIENT)
Dept: URGENT CARE | Age: 78
End: 2023-01-26

## 2023-01-26 VITALS
HEART RATE: 65 BPM | OXYGEN SATURATION: 98 % | TEMPERATURE: 97.6 F | SYSTOLIC BLOOD PRESSURE: 183 MMHG | RESPIRATION RATE: 16 BRPM | DIASTOLIC BLOOD PRESSURE: 81 MMHG

## 2023-01-26 DIAGNOSIS — L03.116 CELLULITIS OF LEFT LOWER EXTREMITY: Primary | ICD-10-CM

## 2023-01-26 RX ORDER — SULFAMETHOXAZOLE AND TRIMETHOPRIM 800; 160 MG/1; MG/1
1 TABLET ORAL EVERY 12 HOURS SCHEDULED
Qty: 14 TABLET | Refills: 0 | Status: SHIPPED | OUTPATIENT
Start: 2023-01-26 | End: 2023-01-31 | Stop reason: SDUPTHER

## 2023-01-26 NOTE — TELEPHONE ENCOUNTER
Patient called to left you know he cellulitis got worse, had to go to [facility]  Was treated with another antibiotic

## 2023-01-26 NOTE — PROGRESS NOTES
[facility]        NAME: Tony Boyd is a 65 y o  male  : 1945    MRN: [id number]  DATE: 2023  TIME: 12:30 PM    Assessment and Plan   Cellulitis of left lower extremity [L03 116]  1  Cellulitis of left lower extremity  sulfamethoxazole-trimethoprim (BACTRIM DS) 800-160 mg per tablet    Ambulatory Referral to Podiatry            Patient Instructions     Please take Bactrim 2 times daily for the next 7 days  A referral was placed to podiatry for further work-up and evaluation of your ongoing cellulitis  They will contact you to schedule an appointment  Follow up with PCP in 3-5 days  Proceed to  ER if symptoms worsen  Chief Complaint     Chief Complaint   Patient presents with   • Wound Infection     Chronic wound on left ankle, red, swollen, pus draining, prescribed cephalexin and amoxicillin with no changes, pus stopped, seen at [facility] now,          History of Present Illness       Patient presenting for evaluation of left ankle cellulitis  Patient states that 5 months ago he had noticed a insect bite on his left knee  He states that he has been seen and treated multiple times by primary care and urgent care for cellulitis  He states that he completed a course of amoxicillin and 2 courses of Keflex  Patient states that he is seeking care today as he has new onset drainage  He describes the drainage as "maple syrup"  He denies any fevers or chills at this time  Review of Systems   Review of Systems   Constitutional: Negative for chills and fever  Musculoskeletal: Negative for arthralgias and myalgias  Skin: Positive for color change  Negative for wound  Neurological: Negative for weakness and numbness  All other systems reviewed and are negative          Current Medications       Current Outpatient Medications:   •  aspirin 81 MG tablet, Take 1 tablet by mouth daily, Disp: , Rfl:   •  lisinopril (ZESTRIL) 40 mg tablet, Take 1 tablet (40 mg total) by mouth daily, Disp: 90 tablet, Rfl: 1  •  lovastatin (MEVACOR) 40 MG tablet, Take 1 tablet (40 mg total) by mouth daily, Disp: 90 tablet, Rfl: 1  •  metoprolol tartrate (LOPRESSOR) 25 mg tablet, Take 1 tablet (25 mg total) by mouth 2 (two) times a day, Disp: 180 tablet, Rfl: 1  •  Multiple Vitamin (DAILY VALUE MULTIVITAMIN) TABS, Take by mouth, Disp: , Rfl:   •  sulfamethoxazole-trimethoprim (BACTRIM DS) 800-160 mg per tablet, Take 1 tablet by mouth every 12 (twelve) hours for 7 days, Disp: 14 tablet, Rfl: 0  •  neomycin-polymyxin-hydrocortisone (CORTISPORIN) 3 5-56784-3 5 cream, Apply topically 2 (two) times a day (Patient not taking: Reported on 1/26/2023), Disp: 30 g, Rfl: 0    Current Allergies     Allergies as of 01/26/2023   • (No Known Allergies)            The following portions of the patient's history were reviewed and updated as appropriate: allergies, current medications, past family history, past medical history, past social history, past surgical history and problem list      Past Medical History:   Diagnosis Date   • Abnormal stress test 11/21/2019   • Atherosclerosis of native coronary artery without angina pectoris    • Glaucoma    • Hypertension    • Immunization refused    • Mixed hyperlipidemia        Past Surgical History:   Procedure Laterality Date   • COLONOSCOPY  2018       Family History   Problem Relation Age of Onset   • Hypertension Mother    • Hypertension Father    • Heart disease Father    • Cancer Father          Medications have been verified  Objective   BP (!) 183/81   Pulse 65   Temp 97 6 °F (36 4 °C)   Resp 16   SpO2 98%        Physical Exam     Physical Exam  Vitals and nursing note reviewed  Cardiovascular:      Pulses:           Dorsalis pedis pulses are 1+ on the left side  Musculoskeletal:        Feet:    Feet:      Left foot:      Skin integrity: Erythema present  No ulcer, blister, skin breakdown, warmth, callus, dry skin or fissure

## 2023-01-30 NOTE — TELEPHONE ENCOUNTER
Please schedule F/U appt with em tomorrow  Virtual OK  <<----- Click to add NO pertinent Past Medical History No pertinent past medical history

## 2023-01-31 ENCOUNTER — OFFICE VISIT (OUTPATIENT)
Dept: INTERNAL MEDICINE CLINIC | Facility: CLINIC | Age: 78
End: 2023-01-31

## 2023-01-31 VITALS
BODY MASS INDEX: 25.62 KG/M2 | HEART RATE: 72 BPM | SYSTOLIC BLOOD PRESSURE: 160 MMHG | OXYGEN SATURATION: 98 % | WEIGHT: 173 LBS | DIASTOLIC BLOOD PRESSURE: 90 MMHG | HEIGHT: 69 IN | TEMPERATURE: 99.2 F

## 2023-01-31 DIAGNOSIS — L03.116 CELLULITIS OF LEFT LOWER EXTREMITY: Primary | ICD-10-CM

## 2023-01-31 DIAGNOSIS — I10 HYPERTENSION, ESSENTIAL, BENIGN: ICD-10-CM

## 2023-01-31 RX ORDER — SULFAMETHOXAZOLE AND TRIMETHOPRIM 800; 160 MG/1; MG/1
1 TABLET ORAL EVERY 12 HOURS SCHEDULED
Qty: 20 TABLET | Refills: 0 | Status: SHIPPED | OUTPATIENT
Start: 2023-01-31 | End: 2023-02-10

## 2023-01-31 NOTE — PROGRESS NOTES
Name: Aryan Bynum      :       MRN: [id number]  Encounter Provider: Brianna Roberts MD  Encounter Date: 2023   Encounter department: [address] [place] [address]     1  Cellulitis of left lower extremity  -     sulfamethoxazole-trimethoprim (BACTRIM DS) 800-160 mg per tablet; Take 1 tablet by mouth every 12 (twelve) hours for 10 days  -     Ambulatory Referral to Vascular Surgery; Future    2  Hypertension, essential, benign  Assessment & Plan:  Readings much improves as compared with last visit  Subjective      Chief Complaint   Patient presents with   • Follow-up     Follow up on left leg pt went to urgent care on  pt was put on bactrium   • Health Screening     Colonoscopy not scheduled postponed until patient gets his leg better     HPI     F/U for cellulitis of left ankle following an insect bite several weeks back and has been treated with Amoxicillin and Keflex in the past and most recently Bactrim about a week back when he presented to the ED after the wound started to drain clear yellowish fluid  No systemic sts at this time  He has noted significant improvement of the wound in the last few days and a reduction in the size of the swelling  Review of Systems   Constitutional: Positive for activity change  Negative for chills and fever  Respiratory: Negative for chest tightness, shortness of breath and wheezing  Musculoskeletal: Positive for gait problem  Skin: Positive for color change, rash and wound  All other systems reviewed and are negative        Current Outpatient Medications on File Prior to Visit   Medication Sig   • aspirin 81 MG tablet Take 1 tablet by mouth daily   • lisinopril (ZESTRIL) 40 mg tablet Take 1 tablet (40 mg total) by mouth daily   • lovastatin (MEVACOR) 40 MG tablet Take 1 tablet (40 mg total) by mouth daily   • metoprolol tartrate (LOPRESSOR) 25 mg tablet Take 1 tablet (25 mg total) by mouth 2 (two) times a day   • Multiple Vitamin (DAILY VALUE MULTIVITAMIN) TABS Take by mouth   • [DISCONTINUED] sulfamethoxazole-trimethoprim (BACTRIM DS) 800-160 mg per tablet Take 1 tablet by mouth every 12 (twelve) hours for 7 days   • [DISCONTINUED] neomycin-polymyxin-hydrocortisone (CORTISPORIN) 3 5-08128-3 5 cream Apply topically 2 (two) times a day (Patient not taking: Reported on 1/26/2023)       Objective     /90 (BP Location: Left arm, Patient Position: Sitting, Cuff Size: Standard)   Pulse 72   Temp 99 2 °F (37 3 °C) (Temporal)   Ht 5' 8 86" (1 749 m)   Wt 78 5 kg (173 lb)   SpO2 98%   BMI 25 65 kg/m²     Physical Exam  Constitutional:       Appearance: Normal appearance  Cardiovascular:      Rate and Rhythm: Normal rate and regular rhythm  Heart sounds: Normal heart sounds  Pulmonary:      Effort: Pulmonary effort is normal    Musculoskeletal:         General: Swelling and tenderness present  Left lower leg: Edema present  Skin:     Findings: Erythema and rash present  Neurological:      General: No focal deficit present  Mental Status: He is alert  Gen: NAD  Heent: atraumatic, normocephalic  Mouth: is moist  Neck: is supple, no JVD, no carotid bruits     Heart: is regular Normal s1, s2,  Lungs: are clear to auscultation    Ext: left foot and ankle shows erythema  Skin: no rashes, ulcers  Mood: normal affect  Neuro: AAOx3  Paige Deng MD

## 2023-02-09 ENCOUNTER — OFFICE VISIT (OUTPATIENT)
Dept: CARDIOLOGY CLINIC | Facility: CLINIC | Age: 78
End: 2023-02-09

## 2023-02-09 VITALS
HEART RATE: 57 BPM | WEIGHT: 175.5 LBS | BODY MASS INDEX: 26.02 KG/M2 | SYSTOLIC BLOOD PRESSURE: 218 MMHG | DIASTOLIC BLOOD PRESSURE: 92 MMHG

## 2023-02-09 DIAGNOSIS — I49.1 PAC (PREMATURE ATRIAL CONTRACTION): ICD-10-CM

## 2023-02-09 DIAGNOSIS — E78.00 HYPERCHOLESTEROLEMIA: ICD-10-CM

## 2023-02-09 DIAGNOSIS — I10 HYPERTENSION, ESSENTIAL, BENIGN: Primary | ICD-10-CM

## 2023-02-09 RX ORDER — AMLODIPINE BESYLATE 5 MG/1
5 TABLET ORAL DAILY
Qty: 30 TABLET | Refills: 3 | Status: SHIPPED | OUTPATIENT
Start: 2023-02-09

## 2023-02-09 NOTE — PROGRESS NOTES
Cardiology Follow Up    Jarrell Paredes  1945  [id number]  [address]  [phone]    1  Hypertension, essential, benign        2  PAC (premature atrial contraction)        3  Hypercholesterolemia            Interval History: Cardiology follow-up  She is doing well from the cardiac point of view denies any chest pain or dyspnea, no palpitations, no syncope or presyncope  States been compliant with low-cholesterol diet  Lipids last checked total cholesterol 184 HDL 63, , he is on medium intensity statin therapy  Compliant with low-sodium diet, his blood pressures have not been well controlled, he does have whitecoat hypertension syncope  Today's blood pressure was 228/90, follow-up blood pressure for 5 minutes was 200/100  He feels a little flushed  He is ambulatory blood pressure at home occasionally in the 170 range  This has been going on for several months  He thinks he had COVID-19 infection this past August, he was not tested    No orthopnea no PND  No bleeding issues on chronic aspirin therapy      Patient Active Problem List   Diagnosis   • Hypercholesterolemia   • Hypertension, essential, benign   • CAD (coronary artery disease)   • PAC (premature atrial contraction)   • Cellulitis of left lower extremity   • Medicare annual wellness visit, subsequent     Past Medical History:   Diagnosis Date   • Abnormal stress test 11/21/2019   • Atherosclerosis of native coronary artery without angina pectoris    • Glaucoma    • Hypertension    • Immunization refused    • Mixed hyperlipidemia      Social History     Socioeconomic History   • Marital status: /Civil Union     Spouse name: Not on file   • Number of children: Not on file   • Years of education: Not on file   • Highest education level: Not on file   Occupational History   • Not on file   Tobacco Use   • Smoking status: Never   • Smokeless tobacco: Never   Vaping Use   • Vaping Use: Never used   Substance and Sexual Activity   • Alcohol use: Yes   • Drug use: Never   • Sexual activity: Not on file   Other Topics Concern   • Not on file   Social History Narrative   • Not on file     Social Determinants of Health     Financial Resource Strain: Low Risk    • Difficulty of Paying Living Expenses: Not hard at all   Food Insecurity: Not on file   Transportation Needs: No Transportation Needs   • Lack of Transportation (Medical): No   • Lack of Transportation (Non-Medical):  No   Physical Activity: Not on file   Stress: Not on file   Social Connections: Not on file   Intimate Partner Violence: Not on file   Housing Stability: Not on file      Family History   Problem Relation Age of Onset   • Hypertension Mother    • Hypertension Father    • Heart disease Father    • Cancer Father      Past Surgical History:   Procedure Laterality Date   • COLONOSCOPY  2018       Current Outpatient Medications:   •  aspirin 81 MG tablet, Take 1 tablet by mouth daily, Disp: , Rfl:   •  lisinopril (ZESTRIL) 40 mg tablet, Take 1 tablet (40 mg total) by mouth daily, Disp: 90 tablet, Rfl: 1  •  lovastatin (MEVACOR) 40 MG tablet, Take 1 tablet (40 mg total) by mouth daily, Disp: 90 tablet, Rfl: 1  •  metoprolol tartrate (LOPRESSOR) 25 mg tablet, Take 1 tablet (25 mg total) by mouth 2 (two) times a day, Disp: 180 tablet, Rfl: 1  •  Multiple Vitamin (DAILY VALUE MULTIVITAMIN) TABS, Take by mouth, Disp: , Rfl:   •  sulfamethoxazole-trimethoprim (BACTRIM DS) 800-160 mg per tablet, Take 1 tablet by mouth every 12 (twelve) hours for 10 days, Disp: 20 tablet, Rfl: 0  No Known Allergies    Labs:  Orders Only on 11/09/2022   Component Date Value   • Total Cholesterol 11/09/2022 184    • HDL 11/09/2022 63    • Triglycerides 11/09/2022 71    • LDL Calculated 11/09/2022 105 (H)    • Chol HDLC Ratio 11/09/2022 2 9    • Non-HDL Cholesterol 11/09/2022 121    • Glucose, Random 11/09/2022 92    • BUN 11/09/2022 16    • Creatinine 11/09/2022 0 85    • eGFR 11/09/2022 89    • SL AMB BUN/CREATININE RA* 06/32/1766 NOT APPLICABLE    • Sodium 72/15/1808 139    • Potassium 11/09/2022 4 1    • Chloride 11/09/2022 103    • CO2 11/09/2022 30    • Calcium 11/09/2022 9 1    • Protein, Total 11/09/2022 7 0    • Albumin 11/09/2022 4 3    • Globulin 11/09/2022 2 7    • Albumin/Globulin Ratio 11/09/2022 1 6    • TOTAL BILIRUBIN 11/09/2022 0 8    • Alkaline Phosphatase 11/09/2022 74    • AST 11/09/2022 32    • ALT 11/09/2022 16    • Color UA 11/09/2022 YELLOW    • Urine Appearance 11/09/2022 CLEAR    • Specific Gravity 11/09/2022 1 019    • Ph 11/09/2022 6 5    • Glucose, Urine 11/09/2022 NEGATIVE    • Bilirubin, Urine 11/09/2022 NEGATIVE    • Ketone, Urine 11/09/2022 NEGATIVE    • Blood, Urine 11/09/2022 1+ (A)    • Protein, Urine 11/09/2022 1+ (A)    • SL AMB NITRITES URINE, Q* 11/09/2022 NEGATIVE    • Leukocyte Esterase 11/09/2022 NEGATIVE    • SL AMB WBC, URINE 11/09/2022 NONE SEEN    • RBC, Urine 11/09/2022 0-2    • Squamous Epithelial Cells 11/09/2022 NONE SEEN    • Bacteria, UA 11/09/2022 NONE SEEN    • Hyaline Casts 11/09/2022 NONE SEEN    • Comment(s) 11/09/2022     • White Blood Cell Count 11/09/2022 5 6    • Red Blood Cell Count 11/09/2022 4 28    • Hemoglobin 11/09/2022 13 7    • HCT 11/09/2022 40 8    • MCV 11/09/2022 95 3    • MCH 11/09/2022 32 0    • MCHC 11/09/2022 33 6    • RDW 11/09/2022 11 9    • Platelet Count 94/86/9591 215    • SL AMB MPV 11/09/2022 10 5    • Neutrophils (Absolute) 11/09/2022 3,321    • Lymphocytes (Absolute) 11/09/2022 1,568    • Monocytes (Absolute) 11/09/2022 566    • Eosinophils (Absolute) 11/09/2022 118    • Basophils ABS 11/09/2022 28    • Neutrophils 11/09/2022 59 3    • Lymphocytes 11/09/2022 28 0    • Monocytes 11/09/2022 10 1    • Eosinophils 11/09/2022 2 1    • Basophils PCT 11/09/2022 0 5    • TSH W/RFX TO FREE T4 11/09/2022 2 94    • Prostate Specific Antige* 11/09/2022 0 7    • Hemoglobin A1C 11/09/2022 5 3    • Urine Culture, Comprehen* 11/09/2022       Imaging: No results found  Review of Systems:  Review of Systems   Constitutional: Negative for activity change and fatigue  HENT: Negative for nosebleeds  Eyes: Negative for visual disturbance  Respiratory: Negative for apnea, shortness of breath, wheezing and stridor  Cardiovascular: Negative for chest pain, palpitations and leg swelling  Gastrointestinal: Negative for abdominal pain, anal bleeding and blood in stool  Endocrine: Negative for cold intolerance  Genitourinary: Negative for hematuria  Musculoskeletal: Positive for gait problem  Negative for arthralgias and myalgias  Skin: Negative for pallor  Allergic/Immunologic: Negative for immunocompromised state  Neurological: Negative for dizziness, syncope, weakness and light-headedness  Hematological: Does not bruise/bleed easily  Psychiatric/Behavioral: Negative for sleep disturbance  The patient is not nervous/anxious  Physical Exam:  Physical Exam  Vitals reviewed  Constitutional:       General: He is not in acute distress  Appearance: Normal appearance  He is normal weight  He is not ill-appearing, toxic-appearing or diaphoretic  Eyes:      General: No scleral icterus  Neck:      Vascular: No carotid bruit  Cardiovascular:      Rate and Rhythm: Normal rate and regular rhythm  Pulses: Normal pulses  Heart sounds: Normal heart sounds  No murmur heard  No friction rub  No gallop  Comments: Bigeminy pattern at times  Pulmonary:      Effort: Pulmonary effort is normal  No respiratory distress  Breath sounds: Normal breath sounds  No stridor  No wheezing, rhonchi or rales  Musculoskeletal:      Right lower leg: No edema  Left lower leg: No edema  Skin:     General: Skin is warm and dry  Capillary Refill: Capillary refill takes less than 2 seconds  Coloration: Skin is not jaundiced or pale  Findings: No bruising or erythema  Neurological:      Mental Status: He is alert and oriented to person, place, and time  Psychiatric:         Mood and Affect: Mood normal          Discussion/Summary:   Atrial para systole clinically stable, no sustained tachyarrhythmias  Holter monitor 2018 revealed normal sinus rhythm at 37% atrial ectopy burden, echocardiogram 2020 revealed normal left ventricular systolic function with mild left atrial enlargement mild mitral and tricuspid insufficiency with estimated normal pulmonary pressures suggested by Doppler criteria  Overall clinically stable, blood pressure is elevated today with some element of white coat hypertension, he will do ambulatory blood pressure and call me if there are elevated  I added amlodipine 5 mg daily to his medical regimen, he is to call me back in few days with his ambulatory blood pressures     Stress test years ago questionable inferior ischemia, stress test 2018  had  borderline EKG changes but no symptoms or clear-cut ischemia favor no interventions  Right leg edema, history of recurrent cellulitis  He has history of trauma of the leg over 30 years ago  He is being evaluated by vascular surgery  This note was completed in part utilizing [employer] direct voice recognition software  Grammatical errors, random word insertion, spelling mistakes, and incomplete sentences may be an occasional consequence of the system secondary to software limitations, ambient noise and hardware issues  At the time of dictation, efforts were made to edit, clarify and /or correct errors  Please read the chart carefully and recognize, using context, where substitutions have occurred  If you have any questions or concerns about the context, text or information contained within the body of this dictation, please contact myself, the provider, for further clarification

## 2023-02-10 ENCOUNTER — TELEPHONE (OUTPATIENT)
Dept: CARDIOLOGY CLINIC | Facility: CLINIC | Age: 78
End: 2023-02-10

## 2023-02-10 NOTE — TELEPHONE ENCOUNTER
Brenda, my name is Maribel Clarke  Last name is Casey Mohamud  I'm a patient of doctor Marbin Lowe  I saw him yesterday and he changed medication on my blood pressure and he asked me to call in the nurses station and report what my readings were when I took them today  So I do have some readings  If someone can call me here at home, I can relay them to it and then hopefully Doctor Marbin Lowe will see them again  It's Meghan Mooney, patient of doctor Faye  The number here is [phone]  Thank you  Called pt and advised /69 HR 62 then retook 10 mins apart ! 34/68 HR 60     Pt cant believe the difference, pt will call next week with update readings pt feels good

## 2023-02-23 ENCOUNTER — TELEPHONE (OUTPATIENT)
Dept: VASCULAR SURGERY | Facility: CLINIC | Age: 78
End: 2023-02-23

## 2023-03-01 ENCOUNTER — OFFICE VISIT (OUTPATIENT)
Dept: INTERNAL MEDICINE CLINIC | Facility: CLINIC | Age: 78
End: 2023-03-01

## 2023-03-01 VITALS
DIASTOLIC BLOOD PRESSURE: 80 MMHG | SYSTOLIC BLOOD PRESSURE: 170 MMHG | BODY MASS INDEX: 26.6 KG/M2 | HEART RATE: 62 BPM | HEIGHT: 69 IN | OXYGEN SATURATION: 96 % | TEMPERATURE: 98 F | WEIGHT: 179.6 LBS

## 2023-03-01 DIAGNOSIS — R60.0 LEG EDEMA, LEFT: ICD-10-CM

## 2023-03-01 DIAGNOSIS — I10 HYPERTENSION, ESSENTIAL, BENIGN: ICD-10-CM

## 2023-03-01 DIAGNOSIS — L03.116 CELLULITIS OF LEFT LOWER EXTREMITY: Primary | ICD-10-CM

## 2023-03-01 RX ORDER — PRENATAL VIT 91/IRON/FOLIC/DHA 28-975-200
COMBINATION PACKAGE (EA) ORAL 2 TIMES DAILY
Qty: 42 G | Refills: 2 | Status: SHIPPED | OUTPATIENT
Start: 2023-03-01

## 2023-03-01 NOTE — PROGRESS NOTES
Name: Karey Calixto      : [birth year]      MRN: [id number]  Encounter Provider: Kodak Watkins MD  Encounter Date: 3/1/2023   Encounter department: [address] [place] [address]     1  Cellulitis of left lower extremity  Assessment & Plan:  Continue antibiotic as prescribed  ,  Leg elevation  2  Leg edema, left  Assessment & Plan:  Leg elevation  Orders:  -     terbinafine (LamISIL) 1 % cream; Apply topically 2 (two) times a day    3  Hypertension, essential, benign  Assessment & Plan: It is finally under control with amlodipine 5 mg daily  However there is worsening of leg edema  We will discuss with Dr Kenny Parker guarding use of an alternative medication             Subjective     HPI     Aloe up with for cellulitis of left lower extremity  Patient has a previous injury to his left foot when he dropped a grass  on his left foot  He has had chronic ankle and foot edema and chronic venous stasis changes in his left foot  This was recently complicated by secondary cellulitis infection of his left foot for which he was treated with ABX  The cellulitis is much improved however he has edema in his lower extremity due to the Amlodipine    Review of Systems   Skin: Positive for rash  All other systems reviewed and are negative        Past Medical History:   Diagnosis Date   • Abnormal stress test 2019   • Atherosclerosis of native coronary artery without angina pectoris    • Glaucoma    • Hypertension    • Immunization refused    • Mixed hyperlipidemia      Past Surgical History:   Procedure Laterality Date   • COLONOSCOPY  2018     Family History   Problem Relation Age of Onset   • Hypertension Mother    • Hypertension Father    • Heart disease Father    • Cancer Father      Social History     Socioeconomic History   • Marital status: /Civil Union     Spouse name: None   • Number of children: None   • Years of education: None   • Highest education level: None   Occupational History   • None   Tobacco Use   • Smoking status: Never   • Smokeless tobacco: Never   Vaping Use   • Vaping Use: Never used   Substance and Sexual Activity   • Alcohol use: Yes   • Drug use: Never   • Sexual activity: None   Other Topics Concern   • None   Social History Narrative   • None     Social Determinants of Health     Financial Resource Strain: Low Risk    • Difficulty of Paying Living Expenses: Not hard at all   Food Insecurity: Not on file   Transportation Needs: No Transportation Needs   • Lack of Transportation (Medical): No   • Lack of Transportation (Non-Medical): No   Physical Activity: Not on file   Stress: Not on file   Social Connections: Not on file   Intimate Partner Violence: Not on file   Housing Stability: Not on file     Current Outpatient Medications on File Prior to Visit   Medication Sig   • amLODIPine (NORVASC) 5 mg tablet Take 1 tablet (5 mg total) by mouth daily   • aspirin 81 MG tablet Take 1 tablet by mouth daily   • lisinopril (ZESTRIL) 40 mg tablet Take 1 tablet (40 mg total) by mouth daily   • lovastatin (MEVACOR) 40 MG tablet Take 1 tablet (40 mg total) by mouth daily   • metoprolol tartrate (LOPRESSOR) 25 mg tablet Take 1 tablet (25 mg total) by mouth 2 (two) times a day   • Multiple Vitamin (DAILY VALUE MULTIVITAMIN) TABS Take by mouth     No Known Allergies  Immunization History   Administered Date(s) Administered   • COVID-19 PFIZER VACCINE 0 3 ML IM 02/12/2021, 03/05/2021   • Tdap 11/07/2017, 11/07/2017       Objective     /80 (BP Location: Left arm, Patient Position: Sitting, Cuff Size: Standard)   Pulse 62   Temp 98 °F (36 7 °C) (Temporal)   Ht 5' 8 9" (1 75 m)   Wt 81 5 kg (179 lb 9 6 oz)   SpO2 96%   BMI 26 60 kg/m²     Physical Exam     Gen: NAD  Heent: atraumatic, normocephalic  Mouth: is moist  Neck: is supple, no JVD, no carotid bruits     Heart: is regular Normal s1, s2,  Lungs: are clear to auscultation  Abd: soft, non tender, NABS  Ext: Trace edema right lower extremity, 1+ edema left lower extremity distal pulses normal  Skin: rash over L Lext    Skin with signs of ichthyosis    Mood: normal affect  Neuro: AAOx3  Marylu Gómez MD

## 2023-03-01 NOTE — ASSESSMENT & PLAN NOTE
It is finally under control with amlodipine 5 mg daily  However there is worsening of leg edema    We will discuss with Dr Davin Randolph guarding use of an alternative medication

## 2023-03-12 PROBLEM — Z00.00 MEDICARE ANNUAL WELLNESS VISIT, SUBSEQUENT: Status: RESOLVED | Noted: 2023-01-11 | Resolved: 2023-03-12

## 2023-03-21 ENCOUNTER — CONSULT (OUTPATIENT)
Dept: VASCULAR SURGERY | Facility: CLINIC | Age: 78
End: 2023-03-21

## 2023-03-21 VITALS
HEART RATE: 98 BPM | WEIGHT: 184.6 LBS | DIASTOLIC BLOOD PRESSURE: 82 MMHG | SYSTOLIC BLOOD PRESSURE: 146 MMHG | BODY MASS INDEX: 27.34 KG/M2 | HEIGHT: 69 IN

## 2023-03-21 DIAGNOSIS — R60.0 LOCALIZED EDEMA: ICD-10-CM

## 2023-03-21 DIAGNOSIS — I87.2 CHRONIC VENOUS INSUFFICIENCY: Primary | ICD-10-CM

## 2023-03-21 DIAGNOSIS — L03.116 CELLULITIS OF LEFT LOWER EXTREMITY: ICD-10-CM

## 2023-03-21 NOTE — PATIENT INSTRUCTIONS
Venous Insufficiency   WHAT YOU NEED TO KNOW:   What is venous insufficiency? Venous insufficiency is a condition that prevents blood from flowing out of your legs and back to your heart  Veins contain valves that help blood flow in one direction  Venous insufficiency means the valves do not close correctly or fully  Blood flows back and pools in your leg  This can cause problems such as varicose veins  Venous insufficiency may also be called chronic venous insufficiency or venous stasis  What increases my risk for venous insufficiency? A leg injury or blood clot    Being a woman    Pregnancy    Older age    A family history of varicose veins    Smoking cigarettes    Obesity, or not getting enough exercise    What are the signs and symptoms of venous insufficiency? Visible veins on your legs that may be small and red or large, thick, and blue         Swelling in your ankles or calves         Changes in skin color, such as dark or purple skin    An ulcer (open sore) on your leg    Leg pain that is worse when you are menstruating (women) or when you stand, and better when you elevate your legs    Burning or itching    Cramps that happen at night    Thick, hard skin on your legs and ankles    Feeling of heaviness in your legs    How is venous insufficiency diagnosed? Venous duplex imaging  is a procedure used to examine the blood flow through veins  A gel will be applied to your legs  Your healthcare provider will slide a small device called a transducer across the veins  The transducer is a microphone that helps your healthcare provider hear blood moving through the vein  Contrast venography  is a procedure used to show the veins on x-ray pictures  A catheter is guided into the vein  Contrast liquid is injected into the catheter to help the veins show up better in the pictures  Tell the healthcare provider if you have ever had an allergic reaction to contrast liquid      Plethysmography  is a procedure that may be used to find changes in blood pressure through your veins  You will wear a blood pressure cuff on your leg  Changes in pressure and the amount of blood that can circulate through your leg veins are measured  Pressures are measured while you stand, sit, and lift your leg  How is venous insufficiency treated? Medicine  may be given to improve blood flow  The medicines may thin your blood or reduce swelling to help blood flow  You may also need medicine to treat a bacterial infection  Ablation  is a procedure used to close varicose veins  A catheter is guided until it is near the vein  A device will then be guided to the area  The device may produce energy through radiofrequency or a laser  The energy creates heat that will close the blood vessel  Sclerotherapy  is a procedure used to fade visible veins  Your healthcare provider will inject a liquid into a spider vein or varicose vein  The liquid causes irritation in the vein  The vein swells and sticks together  Your body will then absorb the vein  Surgery  may be needed if other treatments do not work  Surgery may be used to repair a leg vein valve or to clip or tie off a vein so blood cannot flow through it  You may need to have a veins removed during surgery called stripping  Surgery may be used to bypass (go around) the damaged vein  Blood will flow through a vein transplanted from another part of your body  What can I do to manage my symptoms? Wear pressure stockings as directed  Pressure stockings help keep blood from pooling in your leg veins  Your healthcare provider can prescribe stockings that are right for you  Do not buy over-the-counter pressure stockings unless your healthcare provider says it is okay  They may not fit correctly or may have elastic that cuts off your circulation  Ask your healthcare provider when to start wearing pressure stockings and how long to wear them each day           Do not sit or stand for long periods of time  If you have to sit for a long time, flex and extend your legs, feet, and ankles  Do this about 10 times every 30 minutes to help keep blood flowing  If you have to stand for a long time, take breaks and sit with your legs elevated  Elevate your legs  Elevate your legs above the level of your heart to reduce swelling  Your healthcare provider may recommend that you keep your legs elevated for 30 minutes at a time  You may need to do this 3 to 4 times per day, or more if your healthcare provider recommends  Do not smoke  Nicotine and other chemicals in cigarettes and cigars can cause blood vessel damage  Ask your healthcare provider for information if you currently smoke and need help to quit  E-cigarettes or smokeless tobacco still contain nicotine  Talk to your healthcare provider before you use these products  Reach or maintain a healthy weight  Extra weight can make venous insufficiency worse  Ask your healthcare provider what a healthy weight is for you  He or she can help you create a weight loss plan if you need to lose weight  Exercise as directed  Walking can help increase blood flow in your calves  Ask your healthcare provider how much exercise you need each day and which exercises are best for you  Care for your skin  Keep your skin clean  Do not use any soaps or lotions that may dry your skin  For example, do not use products that contain fragrance or alcohol  If you have a skin ulcer, your healthcare provider may recommend a wet-to-dry bandage  To do this, apply a wet bandage to your wound and allow it to dry  This will help remove drainage from your wound each time you change the bandage  Your healthcare provider will tell you how often to change your bandage and which kind of bandage to use  Check your wound for signs of infection, such as swelling or pus  Go to physical therapy (PT) as directed    A physical therapist can help you increase movement and range of motion in your legs  When should I seek immediate care? You have a wound that does not heal or is infected  You have an injury that has broken your skin and caused your varicose veins to bleed  Your leg is swollen and hard  You have pain in your leg that does not go away or gets worse  Your legs or feet are turning blue or black  Your leg feels warm, tender, and painful  It may look swollen and red  When should I call my doctor? You have a fever  You have varicose veins and they are painful  You have new or worsening leg pain, swelling, or redness  You have new or worsening ulcers or other sores on your leg  You have questions or concerns about your condition or care  CARE AGREEMENT:   You have the right to help plan your care  Learn about your health condition and how it may be treated  Discuss treatment options with your healthcare providers to decide what care you want to receive  You always have the right to refuse treatment  The above information is an  only  It is not intended as medical advice for individual conditions or treatments  Talk to your doctor, nurse or pharmacist before following any medical regimen to see if it is safe and effective for you  © Copyright [employer] 2022 Information is for End User's use only and may not be sold, redistributed or otherwise used for commercial purposes

## 2023-03-21 NOTE — PROGRESS NOTES
Assessment/Plan:    Chronic venous insufficiency  65year old male with HTN, HLD, CAD, BLE swelling, L>R w/ recent left leg cellulitis referred for vascular evaluation     -BLE swelling and hemosiderin staining of anterior shin, chronic left foot swelling after crush injury years ago   -Suspects insect bite left medial ankle around August, developed a small wound and cellulitis not improved with topical antibiotic or several oral antibiotics, finally resolved with Bactrim   -BLE swelling with no associated pain or discomfort   -We discussed physiology of venous disease, available treatment options and indications for treatment  -Recommended initiation of gradient compression hose  -Rx-30 mmHg compression socks given  -Walk daily for exercise and periodic leg elevation   -Moisturize skin daily to maintain skin integrity  -Follow-up in the office in 3 months to recheck swelling with the use of compression       Diagnoses and all orders for this visit:    Chronic venous insufficiency  -     Compression Stocking    Cellulitis of left lower extremity  -     Ambulatory Referral to Vascular Surgery    Localized edema  -     Compression Stocking          Subjective:      Patient ID: Luis Dickson is a 65 y o  male  Patient is new to our practice referred by Marina Scales MD   Patient c/o left leg swelling  Patient states he suspect he was bit by an insect  Patient is currently taking ASA and Lovastatin  HPI  65year old male with HTN, HLD, CAD, BLE swelling, L>R w/ recent left leg cellulitis referred for vascular evaluation   Patient has BLE swelling and hemosiderin staining of anterior shin, chronic left foot swelling after crush injury years ago/ Left foot swelling improved with walking  Around August patient suspected he was bit by an insect  He developed a deysi at the left medial ankle/lower leg that progressively worsened into multiple pustules with associated leg swelling above foot and ankle   In November was treated with Keflex x2 and then Amoxil  Also tried topical antibiotic  Eventually cleared with Bactrim DS  Skin pustules are cleared and there is residual chronic mild redness of distal 2/3 left lower leg  He denies any significant pain  The following portions of the patient's history were reviewed and updated as appropriate: allergies, current medications, past family history, past medical history, past social history, past surgical history and problem list   ROS reviewed     Review of Systems      Objective:  I have reviewed and made appropriate changes to the review of systems input by the medical assistant  Vitals:    03/21/23 1002   BP: 146/82   BP Location: Right arm   Patient Position: Sitting   Cuff Size: Adult   Pulse: 98   Weight: 83 7 kg (184 lb 9 6 oz)   Height: 5' 8 9" (1 75 m)       Patient Active Problem List   Diagnosis   • Hypercholesterolemia   • Hypertension, essential, benign   • CAD (coronary artery disease)   • PAC (premature atrial contraction)   • Cellulitis of left lower extremity   • Leg edema, left   • Localized edema   • Chronic venous insufficiency       Past Surgical History:   Procedure Laterality Date   • COLONOSCOPY  2018       Family History   Problem Relation Age of Onset   • Hypertension Mother    • Hypertension Father    • Heart disease Father    • Cancer Father        Social History     Socioeconomic History   • Marital status: /Civil Union     Spouse name: Not on file   • Number of children: Not on file   • Years of education: Not on file   • Highest education level: Not on file   Occupational History   • Not on file   Tobacco Use   • Smoking status: Never   • Smokeless tobacco: Never   Vaping Use   • Vaping Use: Never used   Substance and Sexual Activity   • Alcohol use:  Yes   • Drug use: Never   • Sexual activity: Not on file   Other Topics Concern   • Not on file   Social History Narrative   • Not on file     Social Determinants of Health     Financial Resource Strain: Low Risk    • Difficulty of Paying Living Expenses: Not hard at all   Food Insecurity: Not on file   Transportation Needs: No Transportation Needs   • Lack of Transportation (Medical): No   • Lack of Transportation (Non-Medical): No   Physical Activity: Not on file   Stress: Not on file   Social Connections: Not on file   Intimate Partner Violence: Not on file   Housing Stability: Not on file       No Known Allergies      Current Outpatient Medications:   •  amLODIPine (NORVASC) 5 mg tablet, Take 1 tablet (5 mg total) by mouth daily, Disp: 30 tablet, Rfl: 3  •  aspirin 81 MG tablet, Take 1 tablet by mouth daily, Disp: , Rfl:   •  lisinopril (ZESTRIL) 40 mg tablet, Take 1 tablet (40 mg total) by mouth daily, Disp: 90 tablet, Rfl: 1  •  lovastatin (MEVACOR) 40 MG tablet, Take 1 tablet (40 mg total) by mouth daily, Disp: 90 tablet, Rfl: 1  •  metoprolol tartrate (LOPRESSOR) 25 mg tablet, Take 1 tablet (25 mg total) by mouth 2 (two) times a day, Disp: 180 tablet, Rfl: 1  •  Multiple Vitamin (DAILY VALUE MULTIVITAMIN) TABS, Take by mouth, Disp: , Rfl:   •  terbinafine (LamISIL) 1 % cream, Apply topically 2 (two) times a day, Disp: 42 g, Rfl: 2      /82 (BP Location: Right arm, Patient Position: Sitting, Cuff Size: Adult)   Pulse 98   Ht 5' 8 9" (1 75 m)   Wt 83 7 kg (184 lb 9 6 oz)   BMI 27 34 kg/m²          Physical Exam  Vitals and nursing note reviewed  Constitutional:       Appearance: Normal appearance  HENT:      Head: Normocephalic and atraumatic  Eyes:      Extraocular Movements: Extraocular movements intact  Cardiovascular:      Pulses: Normal pulses  Heart sounds: Normal heart sounds  Pulmonary:      Effort: Pulmonary effort is normal       Breath sounds: Normal breath sounds  Abdominal:      General: Bowel sounds are normal       Palpations: Abdomen is soft  Musculoskeletal:         General: Swelling present  Normal range of motion        Comments: No large truncal varicosities, L>R lower extremity swelling, chronic mild redness left distal 2/3 lower leg and right anterior shin mild hemosiderin staining  No open ulceration or weeping    Skin:     General: Skin is warm  Neurological:      General: No focal deficit present  Mental Status: He is alert and oriented to person, place, and time     Psychiatric:         Mood and Affect: Mood normal          Behavior: Behavior normal

## 2023-03-24 NOTE — ASSESSMENT & PLAN NOTE
65year old male with HTN, HLD, CAD, BLE swelling, L>R w/ recent left leg cellulitis referred for vascular evaluation     -BLE swelling and hemosiderin staining of anterior shin, chronic left foot swelling after crush injury years ago   -Suspects insect bite left medial ankle around August, developed a small wound and cellulitis not improved with topical antibiotic or several oral antibiotics, finally resolved with Bactrim   -BLE swelling with no associated pain or discomfort   -We discussed physiology of venous disease, available treatment options and indications for treatment  -Recommended initiation of gradient compression hose  -Rx-30 mmHg compression socks given  -Walk daily for exercise and periodic leg elevation   -Moisturize skin daily to maintain skin integrity  -Follow-up in the office in 3 months to recheck swelling with the use of compression

## 2023-05-12 DIAGNOSIS — E78.00 HYPERCHOLESTEROLEMIA: ICD-10-CM

## 2023-05-12 RX ORDER — LOVASTATIN 40 MG/1
40 TABLET ORAL DAILY
Qty: 90 TABLET | Refills: 1 | Status: SHIPPED | OUTPATIENT
Start: 2023-05-12

## 2023-05-17 ENCOUNTER — OFFICE VISIT (OUTPATIENT)
Dept: INTERNAL MEDICINE CLINIC | Facility: CLINIC | Age: 78
End: 2023-05-17

## 2023-05-17 VITALS
HEIGHT: 69 IN | HEART RATE: 54 BPM | SYSTOLIC BLOOD PRESSURE: 160 MMHG | WEIGHT: 182.6 LBS | OXYGEN SATURATION: 98 % | DIASTOLIC BLOOD PRESSURE: 80 MMHG | BODY MASS INDEX: 27.05 KG/M2 | TEMPERATURE: 98.5 F

## 2023-05-17 DIAGNOSIS — R60.0 LEG EDEMA, LEFT: ICD-10-CM

## 2023-05-17 DIAGNOSIS — I87.2 CHRONIC VENOUS INSUFFICIENCY: ICD-10-CM

## 2023-05-17 DIAGNOSIS — R73.01 IMPAIRED FASTING GLUCOSE: Primary | ICD-10-CM

## 2023-05-17 DIAGNOSIS — I10 HYPERTENSION, ESSENTIAL, BENIGN: ICD-10-CM

## 2023-05-17 DIAGNOSIS — E78.00 HYPERCHOLESTEROLEMIA: ICD-10-CM

## 2023-05-17 NOTE — ASSESSMENT & PLAN NOTE
Blood pressure readings are persistently high during his office visits however he says that when he checks his pressure at home it usually 130 mm or so    BP apparently higher when he is in office

## 2023-05-17 NOTE — PROGRESS NOTES
Assessment/Plan:    Localized edema  Patient would prefer to hold off on a diuretic at this time  I am in agreement since the edema seems to be localized to his left foot and is not generalized  He has gained a few pounds over the last few months and would like to restart his exercise and cutting the grass trying to get his few pounds off  I am agreeable to holding off adding a diuretic at this time  Chronic venous insufficiency  Continue with leg elevation and compression of left lower extremity continue to monitor the rash  We will give Corticosporin ointment to be used over the rash that he has developed at the site of the sock  Hypertension, essential, benign  Blood pressure readings are persistently high during his office visits however he says that when he checks his pressure at home it usually 130 mm or so  BP apparently higher when he is in office                     Diagnoses and all orders for this visit:    Impaired fasting glucose  -     HEMOGLOBIN A1C W/ EAG ESTIMATION; Future    Chronic venous insufficiency    Hypercholesterolemia    Hypertension, essential, benign  -     Lipid Panel with Direct LDL reflex; Future  -     CBC and differential; Future  -     Comprehensive metabolic panel; Future  -     TSH, 3rd generation with Free T4 reflex; Future  -     UA w Reflex to Microscopic w Reflex to Culture    Leg edema, left        Subjective:   Chief Complaint   Patient presents with   • Follow-up     6 month follow lower leg better        Patient ID: Joe Ansari is a 65 y o  male  HPI   Follow-up is for chronic lower extremity swelling and infection  He has had a history of chronic ankle and foot edema and chronic venous stasis changes in his left foot complicated by secondary cellulitis which was treated with antibiotics and then topical antibiotics as well  Wound is finally starting to heal and the skin is now normal looking    However, he still has edema of his left ankle and mild "erythema  Additionally he takes amlodipine 5 mg a day for blood pressure control  He does not like the compression socks      The following portions of the patient's history were reviewed and updated as appropriate: past family history, past medical history, past social history, past surgical history and problem list     Review of Systems      Past Medical History:   Diagnosis Date   • Abnormal stress test 11/21/2019   • Atherosclerosis of native coronary artery without angina pectoris    • Glaucoma    • Hypertension    • Immunization refused    • Mixed hyperlipidemia          Current Outpatient Medications:   •  amLODIPine (NORVASC) 5 mg tablet, Take 1 tablet (5 mg total) by mouth daily, Disp: 30 tablet, Rfl: 3  •  aspirin 81 MG tablet, Take 1 tablet by mouth daily, Disp: , Rfl:   •  lisinopril (ZESTRIL) 40 mg tablet, Take 1 tablet (40 mg total) by mouth daily, Disp: 90 tablet, Rfl: 1  •  lovastatin (MEVACOR) 40 MG tablet, Take 1 tablet (40 mg total) by mouth daily, Disp: 90 tablet, Rfl: 1  •  metoprolol tartrate (LOPRESSOR) 25 mg tablet, Take 1 tablet (25 mg total) by mouth 2 (two) times a day, Disp: 180 tablet, Rfl: 1  •  Multiple Vitamin (DAILY VALUE MULTIVITAMIN) TABS, Take by mouth, Disp: , Rfl:     No Known Allergies    Social History   Past Surgical History:   Procedure Laterality Date   • COLONOSCOPY  2018     Family History   Problem Relation Age of Onset   • Hypertension Mother    • Hypertension Father    • Heart disease Father    • Cancer Father        Objective:  /80 (BP Location: Left arm, Patient Position: Sitting, Cuff Size: Standard)   Pulse (!) 54   Temp 98 5 °F (36 9 °C) (Temporal)   Ht 5' 8 58\" (1 742 m)   Wt 82 8 kg (182 lb 9 6 oz)   SpO2 98%   BMI 27 29 kg/m²     No results found for this or any previous visit (from the past 1344 hour(s))  Physical Exam        Gen: NAD  Heent: atraumatic, normocephalic  Mouth: is moist  Neck: is supple, no JVD, no carotid bruits     Heart: " is regular Normal s1, s2,  Lungs: are clear to auscultation  Abd: soft, non tender, NABS  Ext: edema, left lower leg and foot   distal pulses normal  Skin: rash almost completely clear  Mood: normal affect  Neuro: AAOx3

## 2023-05-17 NOTE — ASSESSMENT & PLAN NOTE
Patient would prefer to hold off on a diuretic at this time  I am in agreement since the edema seems to be localized to his left foot and is not generalized  He has gained a few pounds over the last few months and would like to restart his exercise and cutting the grass trying to get his few pounds off  I am agreeable to holding off adding a diuretic at this time

## 2023-05-17 NOTE — ASSESSMENT & PLAN NOTE
Continue with leg elevation and compression of left lower extremity continue to monitor the rash  We will give Corticosporin ointment to be used over the rash that he has developed at the site of the sock

## 2023-05-24 DIAGNOSIS — I10 HYPERTENSION, ESSENTIAL, BENIGN: ICD-10-CM

## 2023-05-24 RX ORDER — AMLODIPINE BESYLATE 5 MG/1
5 TABLET ORAL DAILY
Qty: 90 TABLET | Refills: 1 | Status: SHIPPED | OUTPATIENT
Start: 2023-05-24

## 2023-07-14 NOTE — PROGRESS NOTES
Assessment/Plan:    Chronic venous insufficiency  65year old male with HTN, HLD, CAD, BLE swelling, L>R w/ recent left leg cellulitis returns to the office for re-evaluation.     -No testing for review. -BLE swelling L>R and hemosiderin staining of anterior shin, chronic left foot swelling after crush injury years ago. -BLE swelling with no associated pain or discomfort. All wounds and his previous rash has completely healed. -Reports compliance with b/l leg compression. No claudication, rest pain or wounds. Recommendations  -Return to the office as needed or if experiencing new or worsening symptoms.   -We discussed physiology of venous disease and lymphedema, available treatment options and indications for treatment. Suspect mixed etiology of venous insufficiency and lymphedema. Reviewed both s/s.  -Recommended continued treatment with conservative treatment measures at this time with use of compression stockings in BL legs.  -Pt requesting refill for compression which was provided today for 30 mmHg compression socks.  -Discussed the importance of daily exercise and frequent leg elevation in addition to using moisturizing cream to maintain skin integrity.    -Referral for lymphedema therapy provided today. -Return to the office PRN. Diagnoses and all orders for this visit:    Lymphedema  -     Ambulatory Referral to PT/OT Lymphedema Therapy; Future  -     Compression Stocking    Chronic venous insufficiency  -     Compression Stocking          Subjective:      Patient ID: Boni Dowling is a 65 y.o. male     Patient present to follow up after 3 months compression usage. Patient is currently taking ASA and Lovastatin. Patient c/o swelling while at home but patient states is for not elevating the legs. 53-year-old male with PMHx HTN, CAD, venous insufficiency returns to the office for re-evaluation after 3 months of compression use. Reports that he is doing well.  He does not have any pain in the b/l legs. He has been using compression every day with no issues. He states that his prior rash has gone away. He reports he does not do any leg elevation at home. He reports in the morning he has less swelling. Has not been wearing compression on the R leg. Denies claudication, no rest pain, no wounds or tissue loss. Takes asa and lovastatin. Denies smoking. The following portions of the patient's history were reviewed and updated as appropriate: allergies, current medications, past family history, past medical history, past social history, past surgical history and problem list.    Review of Systems   Constitutional: Negative. HENT: Negative. Eyes: Negative. Respiratory: Negative. Cardiovascular: Negative. Gastrointestinal: Negative. Endocrine: Negative. Genitourinary: Negative. Musculoskeletal: Negative. Skin: Negative. Allergic/Immunologic: Negative. Neurological: Negative. Hematological: Negative. Psychiatric/Behavioral: Negative. Objective:      /90 (BP Location: Right arm, Patient Position: Sitting, Cuff Size: Adult)   Pulse 68   Ht 5' 8.5" (1.74 m)   Wt 82.6 kg (182 lb)   BMI 27.27 kg/m²          Physical Exam  Vitals and nursing note reviewed. Constitutional:       Appearance: Normal appearance. He is normal weight. HENT:      Head: Normocephalic and atraumatic. Cardiovascular:      Rate and Rhythm: Regular rhythm. Pulses:           Radial pulses are 2+ on the right side and 2+ on the left side. Dorsalis pedis pulses are 2+ on the right side and detected w/ Doppler on the left side. Posterior tibial pulses are detected w/ Doppler on the right side and detected w/ Doppler on the left side. Heart sounds: No murmur heard. Comments: Doppler sounds are biphasic  Pulmonary:      Effort: Pulmonary effort is normal.   Musculoskeletal:         General: No tenderness or signs of injury.       Right lower le+ Edema present. Left lower le+ Edema present. Skin:     General: Skin is warm and dry. Findings: No bruising, erythema or rash. Neurological:      General: No focal deficit present. Mental Status: He is alert and oriented to person, place, and time. Sensory: No sensory deficit. Psychiatric:         Mood and Affect: Mood normal.         Behavior: Behavior normal.           I have reviewed and made appropriate changes to the review of systems input by the medical assistant. Vitals:    23 1335   BP: 120/90   BP Location: Right arm   Patient Position: Sitting   Cuff Size: Adult   Pulse: 68   Weight: 82.6 kg (182 lb)   Height: 5' 8.5" (1.74 m)       Patient Active Problem List   Diagnosis   • Hypercholesterolemia   • Hypertension, essential, benign   • CAD (coronary artery disease)   • PAC (premature atrial contraction)   • Cellulitis of left lower extremity   • Leg edema, left   • Localized edema   • Chronic venous insufficiency   • Lymphedema       Past Surgical History:   Procedure Laterality Date   • COLONOSCOPY  2018       Family History   Problem Relation Age of Onset   • Hypertension Mother    • Hypertension Father    • Heart disease Father    • Cancer Father        Social History     Socioeconomic History   • Marital status: /Civil Union     Spouse name: Not on file   • Number of children: Not on file   • Years of education: Not on file   • Highest education level: Not on file   Occupational History   • Not on file   Tobacco Use   • Smoking status: Never   • Smokeless tobacco: Never   Vaping Use   • Vaping Use: Never used   Substance and Sexual Activity   • Alcohol use:  Yes   • Drug use: Never   • Sexual activity: Not on file   Other Topics Concern   • Not on file   Social History Narrative   • Not on file     Social Determinants of Health     Financial Resource Strain: Low Risk  (2023)    Overall Financial Resource Strain (CARDIA)    • Difficulty of Paying Living Expenses: Not hard at all   Food Insecurity: Not on file   Transportation Needs: No Transportation Needs (1/11/2023)    PRAPARE - Transportation    • Lack of Transportation (Medical): No    • Lack of Transportation (Non-Medical): No   Physical Activity: Not on file   Stress: Not on file   Social Connections: Not on file   Intimate Partner Violence: Not on file   Housing Stability: Not on file       No Known Allergies      Current Outpatient Medications:   •  amLODIPine (NORVASC) 5 mg tablet, Take 1 tablet (5 mg total) by mouth daily, Disp: 90 tablet, Rfl: 1  •  aspirin 81 MG tablet, Take 1 tablet by mouth daily, Disp: , Rfl:   •  lisinopril (ZESTRIL) 40 mg tablet, Take 1 tablet (40 mg total) by mouth daily, Disp: 90 tablet, Rfl: 1  •  lovastatin (MEVACOR) 40 MG tablet, Take 1 tablet (40 mg total) by mouth daily, Disp: 90 tablet, Rfl: 1  •  metoprolol tartrate (LOPRESSOR) 25 mg tablet, Take 1 tablet (25 mg total) by mouth 2 (two) times a day, Disp: 180 tablet, Rfl: 1  •  Multiple Vitamin (DAILY VALUE MULTIVITAMIN) TABS, Take by mouth, Disp: , Rfl:   I have spent a total time of 30 minutes on 07/19/23 in caring for this patient including Risks and benefits of tx options, Instructions for management, Patient and family education, Risk factor reductions, Counseling / Coordination of care, Documenting in the medical record, Reviewing / ordering tests, medicine, procedures   and Obtaining or reviewing history  .

## 2023-07-19 ENCOUNTER — OFFICE VISIT (OUTPATIENT)
Dept: VASCULAR SURGERY | Facility: CLINIC | Age: 78
End: 2023-07-19
Payer: MEDICARE

## 2023-07-19 VITALS
HEART RATE: 68 BPM | BODY MASS INDEX: 26.96 KG/M2 | SYSTOLIC BLOOD PRESSURE: 120 MMHG | DIASTOLIC BLOOD PRESSURE: 90 MMHG | HEIGHT: 69 IN | WEIGHT: 182 LBS

## 2023-07-19 DIAGNOSIS — I87.2 CHRONIC VENOUS INSUFFICIENCY: ICD-10-CM

## 2023-07-19 DIAGNOSIS — I89.0 LYMPHEDEMA: Primary | ICD-10-CM

## 2023-07-19 DIAGNOSIS — I10 HYPERTENSION, ESSENTIAL, BENIGN: ICD-10-CM

## 2023-07-19 PROCEDURE — 99214 OFFICE O/P EST MOD 30 MIN: CPT | Performed by: NURSE PRACTITIONER

## 2023-07-19 RX ORDER — LISINOPRIL 40 MG/1
40 TABLET ORAL DAILY
Qty: 90 TABLET | Refills: 1 | Status: SHIPPED | OUTPATIENT
Start: 2023-07-19

## 2023-07-19 NOTE — PATIENT INSTRUCTIONS
- Call the office if you experience any changes to your legs or feet such as new pain, redness or swelling.    - Stay active. Exercise everyday. Walking is the recommended exercise with a goal of 30 min 3-4 times a week. A healthy weight can assist in decreasing varicose vein symptoms.     - Wear Compression. Put them on in the morning, wear all day and take off before bed at night.     -Elevate your legs above the level of your heart. Elevate for 15 minutes 3-4 times a day. -When looking at buying compression, look for "gradient compression" with a weight 20-30mmHg (medium weight), knee high is fine.  -Try "Cerephex.[employer]"    -A good brand is Sigvaris, soft opaque, knee high. Sophia Naylor is another good brand.

## 2023-07-19 NOTE — ASSESSMENT & PLAN NOTE
65year old male with HTN, HLD, CAD, BLE swelling, L>R w/ recent left leg cellulitis returns to the office for re-evaluation.     -No testing for review. -BLE swelling L>R and hemosiderin staining of anterior shin, chronic left foot swelling after crush injury years ago. -BLE swelling with no associated pain or discomfort. All wounds and his previous rash has completely healed. -Reports compliance with b/l leg compression. No claudication, rest pain or wounds. Recommendations  -Return to the office as needed or if experiencing new or worsening symptoms.   -We discussed physiology of venous disease and lymphedema, available treatment options and indications for treatment. Suspect mixed etiology of venous insufficiency and lymphedema. Reviewed both s/s.  -Recommended continued treatment with conservative treatment measures at this time with use of compression stockings in BL legs.  -Pt requesting refill for compression which was provided today for 30 mmHg compression socks.  -Discussed the importance of daily exercise and frequent leg elevation in addition to using moisturizing cream to maintain skin integrity.    -Referral for lymphedema therapy provided today. -Return to the office PRN.

## 2023-10-03 DIAGNOSIS — I10 HYPERTENSION, UNSPECIFIED TYPE: ICD-10-CM

## 2023-10-30 DIAGNOSIS — E78.00 HYPERCHOLESTEROLEMIA: ICD-10-CM

## 2023-10-30 RX ORDER — LOVASTATIN 40 MG/1
40 TABLET ORAL DAILY
Qty: 90 TABLET | Refills: 1 | Status: SHIPPED | OUTPATIENT
Start: 2023-10-30

## 2023-11-11 LAB
ALBUMIN SERPL-MCNC: 4.4 G/DL (ref 3.6–5.1)
ALBUMIN/GLOB SERPL: 1.5 (CALC) (ref 1–2.5)
ALP SERPL-CCNC: 74 U/L (ref 35–144)
ALT SERPL-CCNC: 14 U/L (ref 9–46)
APPEARANCE UR: CLEAR
AST SERPL-CCNC: 27 U/L (ref 10–35)
BACTERIA UR QL AUTO: ABNORMAL /HPF
BASOPHILS # BLD AUTO: 39 CELLS/UL (ref 0–200)
BASOPHILS NFR BLD AUTO: 0.7 %
BILIRUB SERPL-MCNC: 0.7 MG/DL (ref 0.2–1.2)
BILIRUB UR QL STRIP: NEGATIVE
BUN SERPL-MCNC: 14 MG/DL (ref 7–25)
BUN/CREAT SERPL: NORMAL (CALC) (ref 6–22)
CALCIUM SERPL-MCNC: 9.3 MG/DL (ref 8.6–10.3)
CHLORIDE SERPL-SCNC: 101 MMOL/L (ref 98–110)
CHOLEST SERPL-MCNC: 177 MG/DL
CHOLEST/HDLC SERPL: 2.7 (CALC)
CO2 SERPL-SCNC: 32 MMOL/L (ref 20–32)
COLOR UR: YELLOW
CREAT SERPL-MCNC: 0.79 MG/DL (ref 0.7–1.28)
EOSINOPHIL # BLD AUTO: 90 CELLS/UL (ref 15–500)
EOSINOPHIL NFR BLD AUTO: 1.6 %
ERYTHROCYTE [DISTWIDTH] IN BLOOD BY AUTOMATED COUNT: 11.9 % (ref 11–15)
EST. AVERAGE GLUCOSE BLD GHB EST-MCNC: 108 MG/DL
EST. AVERAGE GLUCOSE BLD GHB EST-SCNC: 6 MMOL/L
GFR/BSA.PRED SERPLBLD CYS-BASED-ARV: 90 ML/MIN/1.73M2
GLOBULIN SER CALC-MCNC: 2.9 G/DL (CALC) (ref 1.9–3.7)
GLUCOSE SERPL-MCNC: 98 MG/DL (ref 65–99)
GLUCOSE UR QL STRIP: NEGATIVE
HBA1C MFR BLD: 5.4 % OF TOTAL HGB
HCT VFR BLD AUTO: 40.5 % (ref 38.5–50)
HDLC SERPL-MCNC: 66 MG/DL
HGB BLD-MCNC: 13.5 G/DL (ref 13.2–17.1)
HGB UR QL STRIP: ABNORMAL
HYALINE CASTS #/AREA URNS LPF: ABNORMAL /LPF
KETONES UR QL STRIP: NEGATIVE
LDLC SERPL CALC-MCNC: 94 MG/DL (CALC)
LEUKOCYTE ESTERASE UR QL STRIP: NEGATIVE
LYMPHOCYTES # BLD AUTO: 1350 CELLS/UL (ref 850–3900)
LYMPHOCYTES NFR BLD AUTO: 24.1 %
MCH RBC QN AUTO: 31.2 PG (ref 27–33)
MCHC RBC AUTO-ENTMCNC: 33.3 G/DL (ref 32–36)
MCV RBC AUTO: 93.5 FL (ref 80–100)
MONOCYTES # BLD AUTO: 521 CELLS/UL (ref 200–950)
MONOCYTES NFR BLD AUTO: 9.3 %
NEUTROPHILS # BLD AUTO: 3601 CELLS/UL (ref 1500–7800)
NEUTROPHILS NFR BLD AUTO: 64.3 %
NITRITE UR QL STRIP: NEGATIVE
NONHDLC SERPL-MCNC: 111 MG/DL (CALC)
PH UR STRIP: 7 [PH] (ref 5–8)
PLATELET # BLD AUTO: 229 THOUSAND/UL (ref 140–400)
PMV BLD REES-ECKER: 10.5 FL (ref 7.5–12.5)
POTASSIUM SERPL-SCNC: 4.6 MMOL/L (ref 3.5–5.3)
PROT SERPL-MCNC: 7.3 G/DL (ref 6.1–8.1)
PROT UR QL STRIP: NEGATIVE
RBC # BLD AUTO: 4.33 MILLION/UL (ref 4.2–5.8)
RBC #/AREA URNS HPF: ABNORMAL /HPF
SODIUM SERPL-SCNC: 137 MMOL/L (ref 135–146)
SP GR UR STRIP: 1.01 (ref 1–1.03)
SQUAMOUS #/AREA URNS HPF: ABNORMAL /HPF
TRIGL SERPL-MCNC: 79 MG/DL
TSH SERPL-ACNC: 2.7 MIU/L (ref 0.4–4.5)
WBC # BLD AUTO: 5.6 THOUSAND/UL (ref 3.8–10.8)
WBC #/AREA URNS HPF: ABNORMAL /HPF

## 2023-11-14 ENCOUNTER — RA CDI HCC (OUTPATIENT)
Dept: OTHER | Facility: HOSPITAL | Age: 78
End: 2023-11-14

## 2023-11-21 ENCOUNTER — OFFICE VISIT (OUTPATIENT)
Age: 78
End: 2023-11-21
Payer: MEDICARE

## 2023-11-21 VITALS
WEIGHT: 176 LBS | BODY MASS INDEX: 26.07 KG/M2 | DIASTOLIC BLOOD PRESSURE: 72 MMHG | OXYGEN SATURATION: 96 % | SYSTOLIC BLOOD PRESSURE: 156 MMHG | TEMPERATURE: 98.3 F | HEIGHT: 69 IN | HEART RATE: 67 BPM

## 2023-11-21 DIAGNOSIS — I89.0 LYMPHEDEMA: ICD-10-CM

## 2023-11-21 DIAGNOSIS — I25.10 CORONARY ARTERY DISEASE INVOLVING NATIVE CORONARY ARTERY OF NATIVE HEART WITHOUT ANGINA PECTORIS: ICD-10-CM

## 2023-11-21 DIAGNOSIS — I10 HYPERTENSION, ESSENTIAL, BENIGN: ICD-10-CM

## 2023-11-21 DIAGNOSIS — E78.00 HYPERCHOLESTEROLEMIA: ICD-10-CM

## 2023-11-21 DIAGNOSIS — N40.0 BENIGN PROSTATIC HYPERPLASIA WITHOUT LOWER URINARY TRACT SYMPTOMS: Primary | ICD-10-CM

## 2023-11-21 PROCEDURE — 99214 OFFICE O/P EST MOD 30 MIN: CPT | Performed by: INTERNAL MEDICINE

## 2023-11-21 NOTE — ASSESSMENT & PLAN NOTE
Fair control. Does have " white coat syndrome" which causes significant BP elevations while at office.   But much improved than before

## 2023-11-21 NOTE — PROGRESS NOTES
Name: Lalitha France      : [id number]      MRN: [id number]  Encounter Provider: Freddy Pisano MD  Encounter Date: 2023   Encounter department: [facility]     1. Benign prostatic hyperplasia without lower urinary tract symptoms  -     PSA Total (Reflex To Free); Future    2. Hypertension, essential, benign  Assessment & Plan:  Fair control. Does have " white coat syndrome" which causes significant BP elevations while at office. But much improved than before      3. Lymphedema  Assessment & Plan:  Chronic edema of LL ext with recurrent cellulitis. Much improved from a year ago     Orders:  -     Compression Stocking    4. Hypercholesterolemia  Assessment & Plan:  High ASCVD risk. All correctable factors have been addressed      5. Coronary artery disease involving native coronary artery of native heart without angina pectoris  Assessment & Plan:  Hx of borderline abn stress test in the past several years back but has not had any imaging since then    No further testing has been done since. He does follow up with Cardiology, will discuss consideration for Coronary artery calcium score            Depression Screening and Follow-up Plan: Patient was screened for depression during today's encounter. They screened negative with a PHQ-2 score of 0. Subjective     Chief Complaint   Patient presents with   • Follow-up     Review labs done 11/10   • Health Screening     Medicare Wellness Visit due 2024 needs to be scheduled depression screening fall risk       HPI    78 yo male with PMH of HTN, hypercholesterolemia,  in good health is here for F/U of chronic conditions. Overall feels well. Ongoing issue with left lower leg edema secondary to a prvious injury. Previous hx of borderline abnormal stress test with possible reversible ischemia in the RCA territory. He has no present symptoms of CP, SOB or angina.   Remains active, still mows the grass at his home. Review of Systems   Cardiovascular:  Positive for leg swelling. Skin:  Positive for rash. All other systems reviewed and are negative. Past Medical History:   Diagnosis Date   • Abnormal stress test 11/21/2019   • Atherosclerosis of native coronary artery without angina pectoris    • Glaucoma    • Hypertension    • Immunization refused    • Mixed hyperlipidemia      Past Surgical History:   Procedure Laterality Date   • COLONOSCOPY  2018     Family History   Problem Relation Age of Onset   • Hypertension Mother    • Hypertension Father    • Heart disease Father    • Cancer Father      Social History     Socioeconomic History   • Marital status: /Civil Union     Spouse name: None   • Number of children: None   • Years of education: None   • Highest education level: None   Occupational History   • None   Tobacco Use   • Smoking status: Never   • Smokeless tobacco: Never   Vaping Use   • Vaping Use: Never used   Substance and Sexual Activity   • Alcohol use: Yes   • Drug use: Never   • Sexual activity: None   Other Topics Concern   • None   Social History Narrative   • None     Social Determinants of Health     Financial Resource Strain: Low Risk  (1/11/2023)    Overall Financial Resource Strain (CARDIA)    • Difficulty of Paying Living Expenses: Not hard at all   Food Insecurity: Not on file   Transportation Needs: No Transportation Needs (1/11/2023)    PRAPARE - Transportation    • Lack of Transportation (Medical): No    • Lack of Transportation (Non-Medical):  No   Physical Activity: Not on file   Stress: Not on file   Social Connections: Not on file   Intimate Partner Violence: Not on file   Housing Stability: Not on file     Current Outpatient Medications on File Prior to Visit   Medication Sig   • amLODIPine (NORVASC) 5 mg tablet Take 1 tablet (5 mg total) by mouth daily   • aspirin 81 MG tablet Take 1 tablet by mouth daily   • lisinopril (ZESTRIL) 40 mg tablet Take 1 tablet (40 mg total) by mouth daily   • lovastatin (MEVACOR) 40 MG tablet Take 1 tablet (40 mg total) by mouth daily   • metoprolol tartrate (LOPRESSOR) 25 mg tablet take 1 tablet by mouth twice a day   • Multiple Vitamin (DAILY VALUE MULTIVITAMIN) TABS Take by mouth     No Known Allergies  Immunization History   Administered Date(s) Administered   • COVID-19 PFIZER VACCINE 0.3 ML IM 02/12/2021, 03/05/2021   • Tdap 11/07/2017, 11/07/2017       Objective     /72 (BP Location: Left arm, Patient Position: Sitting, Cuff Size: Standard)   Pulse 67   Temp 98.3 °F (36.8 °C) (Temporal)   Ht 5' 8.58" (1.742 m)   Wt 79.8 kg (176 lb)   SpO2 96%   BMI 26.31 kg/m²     Physical Exam    Gen: NAD  Heent: atraumatic, normocephalic  Mouth: is moist  Neck: is supple, no JVD, no carotid bruits.    Heart: is regular Normal s1, s2,  Lungs: are clear to auscultation  Abd: soft, non tender, NABS  Ext: B/L edema, left greater than right  Skin: no rashes, ulcers  Mood: normal affect  Neuro: AAOx3   Yancy Dunn MD

## 2023-11-21 NOTE — ASSESSMENT & PLAN NOTE
Hx of borderline abn stress test in the past several years back but has not had any imaging since then    No further testing has been done since. He does follow up with Cardiology, will discuss consideration for Coronary artery calcium score

## 2023-11-23 LAB — PSA SERPL-MCNC: 0.6 NG/ML

## 2023-11-24 DIAGNOSIS — I10 HYPERTENSION, ESSENTIAL, BENIGN: ICD-10-CM

## 2023-11-24 RX ORDER — AMLODIPINE BESYLATE 5 MG/1
5 TABLET ORAL DAILY
Qty: 90 TABLET | Refills: 1 | Status: SHIPPED | OUTPATIENT
Start: 2023-11-24

## 2024-01-16 DIAGNOSIS — I10 HYPERTENSION, ESSENTIAL, BENIGN: ICD-10-CM

## 2024-01-16 RX ORDER — LISINOPRIL 40 MG/1
40 TABLET ORAL DAILY
Qty: 90 TABLET | Refills: 1 | Status: SHIPPED | OUTPATIENT
Start: 2024-01-16

## 2024-02-09 ENCOUNTER — OFFICE VISIT (OUTPATIENT)
Dept: CARDIOLOGY CLINIC | Facility: CLINIC | Age: 79
End: 2024-02-09
Payer: MEDICARE

## 2024-02-09 ENCOUNTER — TELEPHONE (OUTPATIENT)
Dept: CARDIOLOGY CLINIC | Facility: CLINIC | Age: 79
End: 2024-02-09

## 2024-02-09 VITALS
DIASTOLIC BLOOD PRESSURE: 80 MMHG | WEIGHT: 185 LBS | BODY MASS INDEX: 27.4 KG/M2 | SYSTOLIC BLOOD PRESSURE: 184 MMHG | OXYGEN SATURATION: 97 % | HEIGHT: 69 IN | HEART RATE: 63 BPM

## 2024-02-09 DIAGNOSIS — I10 HYPERTENSION, ESSENTIAL, BENIGN: ICD-10-CM

## 2024-02-09 DIAGNOSIS — I25.10 CORONARY ARTERY DISEASE INVOLVING NATIVE CORONARY ARTERY OF NATIVE HEART WITHOUT ANGINA PECTORIS: Primary | ICD-10-CM

## 2024-02-09 DIAGNOSIS — I87.2 CHRONIC VENOUS INSUFFICIENCY: ICD-10-CM

## 2024-02-09 DIAGNOSIS — R94.39 ABNORMAL STRESS TEST: ICD-10-CM

## 2024-02-09 DIAGNOSIS — I49.1 PAC (PREMATURE ATRIAL CONTRACTION): ICD-10-CM

## 2024-02-09 DIAGNOSIS — E78.00 HYPERCHOLESTEROLEMIA: ICD-10-CM

## 2024-02-09 PROCEDURE — 99213 OFFICE O/P EST LOW 20 MIN: CPT | Performed by: INTERNAL MEDICINE

## 2024-02-09 PROCEDURE — 93000 ELECTROCARDIOGRAM COMPLETE: CPT | Performed by: INTERNAL MEDICINE

## 2024-02-09 RX ORDER — FUROSEMIDE 20 MG/1
20 TABLET ORAL DAILY
Qty: 30 TABLET | Refills: 3 | Status: SHIPPED | OUTPATIENT
Start: 2024-02-09

## 2024-02-09 NOTE — PROGRESS NOTES
Cardiology Follow Up    Judd Khan  1945  [id number]  [facility] CARDIAC CATH LAB  [address] [facility] [address]  [phone]    1. Coronary artery disease involving native coronary artery of native heart without angina pectoris        2. PAC (premature atrial contraction)        3. Chronic venous insufficiency        4. Hypertension, essential, benign        5. Hypercholesterolemia        6. Abnormal stress test            Interval History: Cardiology follow-up.  Patient is being clinically stable from the cardiac point of view.  No significant  palpitations, no syncope or presyncope.  Denies any chest pain or dyspnea compliant with low-cholesterol diet, lipids last checked close to 177, HDL 66, LDL 94 he is on medium intense statin therapy.  Denies any bleeding issues on chronic aspirin therapy.  Compliant with low-sodium diet, his blood pressure has been well-controlled.  He tells me his ambulatory blood pressures are in the 130s to 140s over 80s diastolic range.  Today's is 148.  Continues to complain of lower extremity edema, we did start him on calcium channel blocker that did not result in worsening edema.    Patient Active Problem List   Diagnosis    Abnormal stress test    Hypercholesterolemia    Hypertension, essential, benign    CAD (coronary artery disease)    PAC (premature atrial contraction)    Cellulitis of left lower extremity    Leg edema, left    Localized edema    Chronic venous insufficiency    Lymphedema     Past Medical History:   Diagnosis Date    Abnormal stress test 11/21/2019    Atherosclerosis of native coronary artery without angina pectoris     Glaucoma     Hypertension     Immunization refused     Mixed hyperlipidemia      Social History     Socioeconomic History    Marital status: /Civil Union     Spouse name: Not on file    Number of children: Not on file    Years of education: Not on  file    Highest education level: Not on file   Occupational History    Not on file   Tobacco Use    Smoking status: Never    Smokeless tobacco: Never   Vaping Use    Vaping status: Never Used   Substance and Sexual Activity    Alcohol use: Yes    Drug use: Never    Sexual activity: Not on file   Other Topics Concern    Not on file   Social History Narrative    Not on file     Social Determinants of Health     Financial Resource Strain: Low Risk  (1/11/2023)    Overall Financial Resource Strain (CARDIA)     Difficulty of Paying Living Expenses: Not hard at all   Food Insecurity: Not on file   Transportation Needs: No Transportation Needs (1/11/2023)    PRAPARE - Transportation     Lack of Transportation (Medical): No     Lack of Transportation (Non-Medical): No   Physical Activity: Not on file   Stress: Not on file   Social Connections: Not on file   Intimate Partner Violence: Not on file   Housing Stability: Not on file      Family History   Problem Relation Age of Onset    Hypertension Mother     Hypertension Father     Heart disease Father     Cancer Father      Past Surgical History:   Procedure Laterality Date    COLONOSCOPY  2018       Current Outpatient Medications:     amLODIPine (NORVASC) 5 mg tablet, Take 1 tablet (5 mg total) by mouth daily, Disp: 90 tablet, Rfl: 1    aspirin 81 MG tablet, Take 1 tablet by mouth daily, Disp: , Rfl:     lisinopril (ZESTRIL) 40 mg tablet, Take 1 tablet (40 mg total) by mouth daily, Disp: 90 tablet, Rfl: 1    lovastatin (MEVACOR) 40 MG tablet, Take 1 tablet (40 mg total) by mouth daily, Disp: 90 tablet, Rfl: 1    metoprolol tartrate (LOPRESSOR) 25 mg tablet, take 1 tablet by mouth twice a day, Disp: 180 tablet, Rfl: 1    Multiple Vitamin (DAILY VALUE MULTIVITAMIN) TABS, Take by mouth, Disp: , Rfl:   No Known Allergies    Labs:  Orders Only on 11/22/2023   Component Date Value    Prostate Specific Antige* 11/22/2023 0.6    Orders Only on 11/10/2023   Component Date Value     Total Cholesterol 11/10/2023 177     HDL 11/10/2023 66     Triglycerides 11/10/2023 79     LDL Calculated 11/10/2023 94     Chol HDLC Ratio 11/10/2023 2.7     Non-HDL Cholesterol 11/10/2023 111     Glucose, Random 11/10/2023 98     BUN 11/10/2023 14     Creatinine 11/10/2023 0.79     eGFR 11/10/2023 90     SL AMB BUN/CREATININE RA* 11/10/2023 SEE NOTE:     Sodium 11/10/2023 137     Potassium 11/10/2023 4.6     Chloride 11/10/2023 101     CO2 11/10/2023 32     Calcium 11/10/2023 9.3     Protein, Total 11/10/2023 7.3     Albumin 11/10/2023 4.4     Globulin 11/10/2023 2.9     Albumin/Globulin Ratio 11/10/2023 1.5     TOTAL BILIRUBIN 11/10/2023 0.7     Alkaline Phosphatase 11/10/2023 74     AST 11/10/2023 27     ALT 11/10/2023 14     Color UA 11/10/2023 YELLOW     Urine Appearance 11/10/2023 CLEAR     Specific Gravity 11/10/2023 1.011     Ph 11/10/2023 7.0     Glucose, Urine 11/10/2023 NEGATIVE     Bilirubin, Urine 11/10/2023 NEGATIVE     Ketone, Urine 11/10/2023 NEGATIVE     Blood, Urine 11/10/2023 TRACE (A)     Protein, Urine 11/10/2023 NEGATIVE     Nitrites Urine 11/10/2023 NEGATIVE     Leukocyte Esterase 11/10/2023 NEGATIVE     SL AMB WBC, URINE 11/10/2023 NONE SEEN     RBC, Urine 11/10/2023 0-2     Squamous Epithelial Cells 11/10/2023 NONE SEEN     Bacteria, UA 11/10/2023 NONE SEEN     Hyaline Casts 11/10/2023 NONE SEEN     Comment(s) 11/10/2023      White Blood Cell Count 11/10/2023 5.6     Red Blood Cell Count 11/10/2023 4.33     Hemoglobin 11/10/2023 13.5     HCT 11/10/2023 40.5     MCV 11/10/2023 93.5     MCH 11/10/2023 31.2     MCHC 11/10/2023 33.3     RDW 11/10/2023 11.9     Platelet Count 11/10/2023 229     SL AMB MPV 11/10/2023 10.5     Neutrophils (Absolute) 11/10/2023 3,601     Lymphocytes (Absolute) 11/10/2023 1,350     Monocytes (Absolute) 11/10/2023 521     Eosinophils (Absolute) 11/10/2023 90     Basophils ABS 11/10/2023 39     Neutrophils 11/10/2023 64.3     Lymphocytes 11/10/2023 24.1     Monocytes  11/10/2023 9.3     Eosinophils 11/10/2023 1.6     Basophils PCT 11/10/2023 0.7     TSH W/RFX TO FREE T4 11/10/2023 2.70     Hemoglobin A1C 11/10/2023 5.4     Estimated Average Glucose 11/10/2023 108     Estimated Average Glucos* 11/10/2023 6.0     Urine Culture, Comprehen* 11/10/2023       Imaging: No results found.    Review of Systems:  Review of Systems   HENT:  Negative for nosebleeds.    Respiratory:  Negative for shortness of breath, wheezing and stridor.    Cardiovascular:  Positive for palpitations and leg swelling. Negative for chest pain.   Gastrointestinal:  Negative for anal bleeding and blood in stool.   Genitourinary:  Negative for hematuria.   Skin:  Negative for pallor and rash.   Neurological:  Negative for dizziness, syncope, weakness and light-headedness.   Hematological:  Does not bruise/bleed easily.   Psychiatric/Behavioral:  Negative for sleep disturbance. The patient is not nervous/anxious.        Physical Exam:  Physical Exam  Vitals reviewed.   Constitutional:       General: He is not in acute distress.     Appearance: Normal appearance. He is normal weight. He is not ill-appearing, toxic-appearing or diaphoretic.   Eyes:      General: No scleral icterus.  Neck:      Vascular: No carotid bruit.   Cardiovascular:      Rate and Rhythm: Normal rate and regular rhythm.      Pulses: Normal pulses.      Heart sounds: Normal heart sounds. No murmur heard.     No friction rub. No gallop.   Pulmonary:      Effort: Pulmonary effort is normal. No respiratory distress.      Breath sounds: Normal breath sounds. No stridor. No wheezing, rhonchi or rales.   Musculoskeletal:      Right lower leg: Edema present.      Left lower leg: Edema present.   Skin:     General: Skin is warm and dry.      Capillary Refill: Capillary refill takes less than 2 seconds.      Coloration: Skin is not jaundiced or pale.      Findings: Erythema (v venous stasis changes,) present. No bruising.   Neurological:      Mental  Status: He is alert and oriented to person, place, and time.   Psychiatric:         Mood and Affect: Mood normal.         Discussion/Summary:   Cardiac arrhythmia.  Atrial para systole.  Clinically stable, no sustained tachyarrhythmias.  Holter monitor 2018 revealed normal sinus rhythm with 37% atrial ectopy burden, echocardiogram 2020 revealed normal left ventricular systolic function with mild left atrial enlargement mild mitral and tricuspid insufficiency with estimated normal pulmonary pressures suggested by Doppler criteria.  Overall clinically stable, blood pressure previously elevated.  There is some element of white coat hypertension,     I added amlodipine 5 mg daily to his medical regimen, he does have lymphedema which is chronic, mostly unchanged with a calcium channel blocker.  Favor continuation.  Trial of low-dose diuretic given to the patient, he is someone  hesitant do it.  He will try however.  Again asking to do ambulatory blood pressures and let me know what the numbers are..  Stress test years ago questionable inferior ischemia, stress test 2018, he did 7 minutes on the Vinicio protocol.  Achieving target heart rate.  Hypertensive response to exercise.  Had  borderline EKG changes that resolved promptly in recovery, but no symptoms . favor no interventions.  Right leg edema, history of recurrent cellulitis.  He has history of trauma of the leg over 30 years ago.  He was being evaluated by vascular surgery he is using compression stockings, he does not do postural maneuvers, and they recommended that..       This note was completed in part utilizing [employer] direct voice recognition software.   Grammatical errors, random word insertion, spelling mistakes, and incomplete sentences may be an occasional consequence of the system secondary to software limitations, ambient noise and hardware issues. At the time of dictation, efforts were made to edit, clarify and /or correct errors.  Please read  the chart carefully and recognize, using context, where substitutions have occurred.  If you have any questions or concerns about the context, text or information contained within the body of this dictation, please contact myself, the provider, for further clarification.

## 2024-02-09 NOTE — TELEPHONE ENCOUNTER
Patient called for clarification if he should continue taking Amlodipine when he starts taking the Furosemide ordered today by Dr Garcia @ patient's OV.    Instructed patient reading Dr Garcia's Discussion/Summary. Instructed patient Dr Garcia did not D/C Amlodipine & continue taking it as ordered, 5 mg daily.    Patient verbalized understanding.    Will monitor BP @ home & keep a log of daily results to show Dr Garcia.

## 2024-02-26 ENCOUNTER — HOSPITAL ENCOUNTER (OUTPATIENT)
Dept: NON INVASIVE DIAGNOSTICS | Facility: CLINIC | Age: 79
Discharge: HOME | End: 2024-02-26
Payer: MEDICARE

## 2024-02-26 DIAGNOSIS — I49.1 PAC (PREMATURE ATRIAL CONTRACTION): ICD-10-CM

## 2024-02-26 PROCEDURE — 93226 XTRNL ECG REC<48 HR SCAN A/R: CPT

## 2024-02-26 PROCEDURE — 93225 XTRNL ECG REC<48 HRS REC: CPT

## 2024-03-01 PROCEDURE — 93227 XTRNL ECG REC<48 HR R&I: CPT | Performed by: INTERNAL MEDICINE

## 2024-03-20 ENCOUNTER — OFFICE VISIT (OUTPATIENT)
Dept: INTERNAL MEDICINE CLINIC | Facility: OTHER | Age: 79
End: 2024-03-20
Payer: MEDICARE

## 2024-03-20 VITALS
HEIGHT: 69 IN | DIASTOLIC BLOOD PRESSURE: 80 MMHG | OXYGEN SATURATION: 98 % | HEART RATE: 66 BPM | TEMPERATURE: 97.3 F | WEIGHT: 184.2 LBS | SYSTOLIC BLOOD PRESSURE: 152 MMHG | BODY MASS INDEX: 27.28 KG/M2

## 2024-03-20 DIAGNOSIS — L03.116 CELLULITIS OF LEFT LOWER EXTREMITY: Primary | ICD-10-CM

## 2024-03-20 PROCEDURE — G2211 COMPLEX E/M VISIT ADD ON: HCPCS | Performed by: NURSE PRACTITIONER

## 2024-03-20 PROCEDURE — 99213 OFFICE O/P EST LOW 20 MIN: CPT | Performed by: NURSE PRACTITIONER

## 2024-03-20 RX ORDER — SULFAMETHOXAZOLE AND TRIMETHOPRIM 800; 160 MG/1; MG/1
1 TABLET ORAL 2 TIMES DAILY
Qty: 14 TABLET | Refills: 0 | Status: SHIPPED | OUTPATIENT
Start: 2024-03-20 | End: 2024-03-28

## 2024-03-20 NOTE — PATIENT INSTRUCTIONS
Start bactrim twice daily for 7 days  Drink plenty of water  Elevate legs as much as possible   Follow up in 1 week, sooner for any worsening symptoms

## 2024-03-20 NOTE — PROGRESS NOTES
Assessment/Plan:    Problem List Items Addressed This Visit       Cellulitis of left lower extremity - Primary     -Start Bactrim twice daily x 7 days  -Elevate leg as often as possible  -Follow-up in 1 week, sooner for any worsening symptoms         Relevant Medications    sulfamethoxazole-trimethoprim (BACTRIM DS) 800-160 mg per tablet              M*Modal software was used to dictate this note.  It may contain errors with dictating incorrect words or incorrect spelling. Please contact the provider directly with any questions.    Subjective:      Patient ID: Judd Khan is a 78 y.o. male.    Rash  Associated symptoms include a fever (feeling feverish last night).       Patient presents today with concern for cellulitis of his LLE.   He has a hx of cellulitis of his left leg last year which was initially treated with cephalexin but finally resolved after being treated with Bactrim  He started has chronic LE edema. He started noticing redness and some pustules on his LE about 1-2 months ago. He has been using zinc oxide topically on his leg.  He denies any pain.   He felt feverish last night, no measured temp.     The following portions of the patient's history were reviewed and updated as appropriate: allergies, current medications, past family history, past medical history, past social history, past surgical history, and problem list.    Review of Systems   Constitutional:  Positive for fever (feeling feverish last night).   Skin:  Positive for rash and wound.         Past Medical History:   Diagnosis Date    Abnormal stress test 11/21/2019    Atherosclerosis of native coronary artery without angina pectoris     Glaucoma     Hypertension     Immunization refused     Mixed hyperlipidemia          Current Outpatient Medications:     amLODIPine (NORVASC) 5 mg tablet, Take 1 tablet (5 mg total) by mouth daily, Disp: 90 tablet, Rfl: 1    aspirin 81 MG tablet, Take 1 tablet by mouth daily, Disp: , Rfl:      "furosemide (LASIX) 20 mg tablet, Take 1 tablet (20 mg total) by mouth daily, Disp: 30 tablet, Rfl: 3    lisinopril (ZESTRIL) 40 mg tablet, Take 1 tablet (40 mg total) by mouth daily, Disp: 90 tablet, Rfl: 1    lovastatin (MEVACOR) 40 MG tablet, Take 1 tablet (40 mg total) by mouth daily, Disp: 90 tablet, Rfl: 1    metoprolol tartrate (LOPRESSOR) 25 mg tablet, take 1 tablet by mouth twice a day, Disp: 180 tablet, Rfl: 1    Multiple Vitamin (DAILY VALUE MULTIVITAMIN) TABS, Take by mouth in the morning, Disp: , Rfl:     sulfamethoxazole-trimethoprim (BACTRIM DS) 800-160 mg per tablet, Take 1 tablet by mouth 2 (two) times a day for 7 days, Disp: 14 tablet, Rfl: 0    No Known Allergies    Social History   Past Surgical History:   Procedure Laterality Date    COLONOSCOPY  2018     Family History   Problem Relation Age of Onset    Hypertension Mother     Hypertension Father     Heart disease Father     Cancer Father        Objective:  /80 (BP Location: Left arm, Patient Position: Sitting, Cuff Size: Standard)   Pulse 66   Temp (!) 97.3 °F (36.3 °C) (Temporal)   Ht 5' 8.5\" (1.74 m)   Wt 83.6 kg (184 lb 3.2 oz)   SpO2 98%   BMI 27.60 kg/m²      Physical Exam  Vitals reviewed.   Constitutional:       General: He is not in acute distress.     Appearance: Normal appearance. He is not diaphoretic.   HENT:      Head: Normocephalic and atraumatic.   Cardiovascular:      Rate and Rhythm: Normal rate and regular rhythm.      Heart sounds: Normal heart sounds.   Pulmonary:      Effort: Pulmonary effort is normal. No respiratory distress.      Breath sounds: Normal breath sounds. No wheezing, rhonchi or rales.   Skin:     Findings: Rash (erythema of left anterior LE, wrapping around, warm with a single pustule noted) present.   Neurological:      Mental Status: He is alert and oriented to person, place, and time. Mental status is at baseline.   Psychiatric:         Mood and Affect: Mood normal.         Behavior: Behavior " normal.

## 2024-03-20 NOTE — ASSESSMENT & PLAN NOTE
-Start Bactrim twice daily x 7 days  -Elevate leg as often as possible  -Follow-up in 1 week, sooner for any worsening symptoms

## 2024-03-23 DIAGNOSIS — I10 HYPERTENSION, UNSPECIFIED TYPE: ICD-10-CM

## 2024-03-28 ENCOUNTER — OFFICE VISIT (OUTPATIENT)
Dept: INTERNAL MEDICINE CLINIC | Facility: OTHER | Age: 79
End: 2024-03-28

## 2024-03-28 VITALS
WEIGHT: 181.4 LBS | HEIGHT: 69 IN | HEART RATE: 65 BPM | TEMPERATURE: 98.4 F | DIASTOLIC BLOOD PRESSURE: 82 MMHG | BODY MASS INDEX: 26.87 KG/M2 | OXYGEN SATURATION: 97 % | SYSTOLIC BLOOD PRESSURE: 128 MMHG

## 2024-03-28 DIAGNOSIS — L03.116 CELLULITIS OF LEFT LOWER EXTREMITY: ICD-10-CM

## 2024-03-28 DIAGNOSIS — L30.9 DERMATITIS: Primary | ICD-10-CM

## 2024-03-28 RX ORDER — TRIAMCINOLONE ACETONIDE 1 MG/G
CREAM TOPICAL 3 TIMES DAILY
Qty: 80 G | Refills: 0 | Status: SHIPPED | OUTPATIENT
Start: 2024-03-28

## 2024-03-28 NOTE — PROGRESS NOTES
Assessment/Plan:    Problem List Items Addressed This Visit       Cellulitis of left lower extremity     - pt completed 7-day course of Bactrim         Dermatitis - Primary     - Left lower extremity evaluated with Dr. Castellon.  Leg has increased redness but appears to be secondary to skin inflammation and eczema  - will treat with triamcinolone 3 times daily for 7 days   -Follow-up in 1 week, sooner for any worsening symptoms         Relevant Medications    triamcinolone (KENALOG) 0.1 % cream            [employer] software was used to dictate this note.  It may contain errors with dictating incorrect words or incorrect spelling. Please contact the provider directly with any questions.    Subjective:      Patient ID: Judd Khan is a 78 y.o. male.    HPI    Patient presents today for 1 week follow up cellulitis.  He was treated with bactrim DS on 3/20 which he has completed.   He continues with LLE edema and redness. He states he still has some weeping. He now has redness on his forefoot as well.       The following portions of the patient's history were reviewed and updated as appropriate: allergies, current medications, past family history, past medical history, past social history, past surgical history, and problem list.    Review of Systems   Constitutional:  Negative for chills and fever.   Cardiovascular:  Positive for leg swelling.   Skin:  Positive for color change.         Past Medical History:   Diagnosis Date    Abnormal stress test 11/21/2019    Atherosclerosis of native coronary artery without angina pectoris     Glaucoma     Hypertension     Immunization refused     Mixed hyperlipidemia          Current Outpatient Medications:     amLODIPine (NORVASC) 5 mg tablet, Take 1 tablet (5 mg total) by mouth daily, Disp: 90 tablet, Rfl: 1    aspirin 81 MG tablet, Take 1 tablet by mouth daily, Disp: , Rfl:     furosemide (LASIX) 20 mg tablet, Take 1 tablet (20 mg total) by mouth daily, Disp: 30 tablet, Rfl: 3     "lisinopril (ZESTRIL) 40 mg tablet, Take 1 tablet (40 mg total) by mouth daily, Disp: 90 tablet, Rfl: 1    lovastatin (MEVACOR) 40 MG tablet, Take 1 tablet (40 mg total) by mouth daily, Disp: 90 tablet, Rfl: 1    metoprolol tartrate (LOPRESSOR) 25 mg tablet, take 1 tablet by mouth twice a day, Disp: 180 tablet, Rfl: 1    Multiple Vitamin (DAILY VALUE MULTIVITAMIN) TABS, Take by mouth in the morning, Disp: , Rfl:     triamcinolone (KENALOG) 0.1 % cream, Apply topically 3 (three) times a day, Disp: 80 g, Rfl: 0    No Known Allergies    Social History   Past Surgical History:   Procedure Laterality Date    COLONOSCOPY  2018     Family History   Problem Relation Age of Onset    Hypertension Mother     Hypertension Father     Heart disease Father     Cancer Father        Objective:  /82 (BP Location: Left arm, Patient Position: Sitting, Cuff Size: Standard)   Pulse 65   Temp 98.4 °F (36.9 °C) (Temporal)   Ht 5' 8.5\" (1.74 m)   Wt 82.3 kg (181 lb 6.4 oz)   SpO2 97%   BMI 27.18 kg/m²      Physical Exam  Vitals reviewed.   Constitutional:       General: He is not in acute distress.     Appearance: Normal appearance. He is normal weight. He is not diaphoretic.   HENT:      Head: Normocephalic and atraumatic.   Pulmonary:      Effort: Pulmonary effort is normal. No respiratory distress.   Skin:     Comments: Pink areas on LLE of extremely dry, cracked and inflamed skin  See photo   Neurological:      Mental Status: He is alert and oriented to person, place, and time. Mental status is at baseline.   Psychiatric:         Mood and Affect: Mood normal.         Behavior: Behavior normal.               "

## 2024-03-28 NOTE — ASSESSMENT & PLAN NOTE
- Left lower extremity evaluated with Dr. Castellon.  Leg has increased redness but appears to be secondary to skin inflammation and eczema  - will treat with triamcinolone 3 times daily for 7 days   -Follow-up in 1 week, sooner for any worsening symptoms

## 2024-03-28 NOTE — PATIENT INSTRUCTIONS
Start triamcinolone 3 times a day   Follow up 1 week  Notify office sooner for any worsening symptoms

## 2024-04-05 DIAGNOSIS — L30.9 DERMATITIS: ICD-10-CM

## 2024-04-05 RX ORDER — TRIAMCINOLONE ACETONIDE 1 MG/G
CREAM TOPICAL 3 TIMES DAILY
Qty: 80 G | Refills: 1 | Status: SHIPPED | OUTPATIENT
Start: 2024-04-05

## 2024-04-05 NOTE — TELEPHONE ENCOUNTER
Patient called having to reschedule his appointment to next week (4/10/24). He states he has enough triamcinolone to get him through the next couple days but will run out before the appointment.       [id number] - [facility] - [address] [place] [address]

## 2024-04-10 ENCOUNTER — OFFICE VISIT (OUTPATIENT)
Dept: INTERNAL MEDICINE CLINIC | Facility: OTHER | Age: 79
End: 2024-04-10
Payer: MEDICARE

## 2024-04-10 VITALS
WEIGHT: 185 LBS | OXYGEN SATURATION: 97 % | BODY MASS INDEX: 28.04 KG/M2 | HEIGHT: 68 IN | SYSTOLIC BLOOD PRESSURE: 132 MMHG | TEMPERATURE: 98.8 F | DIASTOLIC BLOOD PRESSURE: 80 MMHG | HEART RATE: 56 BPM

## 2024-04-10 DIAGNOSIS — L30.9 DERMATITIS: ICD-10-CM

## 2024-04-10 DIAGNOSIS — R09.89 DIMINISHED PULSES IN LOWER EXTREMITY: Primary | ICD-10-CM

## 2024-04-10 PROBLEM — L03.116 CELLULITIS OF LEFT LOWER EXTREMITY: Status: RESOLVED | Noted: 2022-12-28 | Resolved: 2024-04-10

## 2024-04-10 PROCEDURE — 99213 OFFICE O/P EST LOW 20 MIN: CPT | Performed by: NURSE PRACTITIONER

## 2024-04-10 PROCEDURE — G2211 COMPLEX E/M VISIT ADD ON: HCPCS | Performed by: NURSE PRACTITIONER

## 2024-04-10 NOTE — ASSESSMENT & PLAN NOTE
- slowly improving  - continue triamcinolone twice daily  - start eucerin cream 1-2 x a day  - follow up in 2 weeks, sooner for any worsening symptoms

## 2024-04-10 NOTE — PROGRESS NOTES
Assessment/Plan:    Problem List Items Addressed This Visit       Dermatitis     - slowly improving  - continue triamcinolone twice daily  - start eucerin cream 1-2 x a day  - follow up in 2 weeks, sooner for any worsening symptoms           Diminished pulses in lower extremity - Primary     - diminished pulses bilateral LE, slow healing LLE skin changes  - check arterial duplex         Relevant Orders    VAS ARTERIAL DUPLEX- LOWER LIMB BILATERAL          M*Modal software was used to dictate this note.  It may contain errors with dictating incorrect words or incorrect spelling. Please contact the provider directly with any questions.    Subjective:      Patient ID: Judd Khan is a 78 y.o. male.    HPI    Patient presents today for ongoing follow up LLE skin changes, dermatitis. He has been using triamcinolone tid for the last 2 weeks. He states he does notice some improvement. He denies any pain. Swelling is chronic but improving. The skin continues to be very dry and peeling. He is ambulating well, able to move the lawn yesterday without any issues. No fevers.     The following portions of the patient's history were reviewed and updated as appropriate: allergies, current medications, past family history, past medical history, past social history, past surgical history, and problem list.    Review of Systems   Cardiovascular:  Positive for leg swelling (chronic).   Skin:  Positive for color change and rash.         Past Medical History:   Diagnosis Date    Abnormal stress test 11/21/2019    Atherosclerosis of native coronary artery without angina pectoris     Glaucoma     Hypertension     Immunization refused     Mixed hyperlipidemia          Current Outpatient Medications:     amLODIPine (NORVASC) 5 mg tablet, Take 1 tablet (5 mg total) by mouth daily, Disp: 90 tablet, Rfl: 1    aspirin 81 MG tablet, Take 1 tablet by mouth daily, Disp: , Rfl:     furosemide (LASIX) 20 mg tablet, Take 1 tablet (20 mg total) by  "mouth daily, Disp: 30 tablet, Rfl: 3    lisinopril (ZESTRIL) 40 mg tablet, Take 1 tablet (40 mg total) by mouth daily, Disp: 90 tablet, Rfl: 1    lovastatin (MEVACOR) 40 MG tablet, Take 1 tablet (40 mg total) by mouth daily, Disp: 90 tablet, Rfl: 1    metoprolol tartrate (LOPRESSOR) 25 mg tablet, take 1 tablet by mouth twice a day, Disp: 180 tablet, Rfl: 1    Multiple Vitamin (DAILY VALUE MULTIVITAMIN) TABS, Take by mouth in the morning, Disp: , Rfl:     triamcinolone (KENALOG) 0.1 % cream, Apply topically 3 (three) times a day, Disp: 80 g, Rfl: 1    No Known Allergies    Social History   Past Surgical History:   Procedure Laterality Date    COLONOSCOPY  2018     Family History   Problem Relation Age of Onset    Hypertension Mother     Hypertension Father     Heart disease Father     Cancer Father        Objective:  /80 (BP Location: Left arm, Patient Position: Sitting, Cuff Size: Adult)   Pulse 56   Temp 98.8 °F (37.1 °C) (Temporal)   Ht 5' 8\" (1.727 m)   Wt 83.9 kg (185 lb)   SpO2 97% Comment: ra  BMI 28.13 kg/m²      Physical Exam  Vitals reviewed.   Constitutional:       General: He is not in acute distress.     Appearance: Normal appearance. He is not diaphoretic.   HENT:      Head: Normocephalic and atraumatic.   Pulmonary:      Effort: Pulmonary effort is normal. No respiratory distress.   Musculoskeletal:      Right lower leg: Edema present.      Left lower leg: Edema present.   Skin:     Comments: Dry, flaking skin of LLE. Brownish red discoloration. Cool to touch, no warmth or tenderness. See photo   Neurological:      Mental Status: He is alert and oriented to person, place, and time. Mental status is at baseline.   Psychiatric:         Mood and Affect: Mood normal.         Behavior: Behavior normal.             "

## 2024-04-24 ENCOUNTER — OFFICE VISIT (OUTPATIENT)
Dept: INTERNAL MEDICINE CLINIC | Facility: OTHER | Age: 79
End: 2024-04-24
Payer: MEDICARE

## 2024-04-24 VITALS
BODY MASS INDEX: 27.37 KG/M2 | DIASTOLIC BLOOD PRESSURE: 72 MMHG | SYSTOLIC BLOOD PRESSURE: 150 MMHG | HEIGHT: 69 IN | WEIGHT: 184.8 LBS | HEART RATE: 60 BPM | OXYGEN SATURATION: 98 % | TEMPERATURE: 96.8 F

## 2024-04-24 DIAGNOSIS — L30.9 DERMATITIS: Primary | ICD-10-CM

## 2024-04-24 DIAGNOSIS — R09.89 DIMINISHED PULSES IN LOWER EXTREMITY: ICD-10-CM

## 2024-04-24 PROCEDURE — G2211 COMPLEX E/M VISIT ADD ON: HCPCS | Performed by: NURSE PRACTITIONER

## 2024-04-24 PROCEDURE — 99213 OFFICE O/P EST LOW 20 MIN: CPT | Performed by: NURSE PRACTITIONER

## 2024-04-24 NOTE — ASSESSMENT & PLAN NOTE
- significantly improved  - can discontinue steroid cream. Continue Eucerin daily  - follow up with PCP as scheduled in 1 month  - notify office of any worsening symptoms sooner

## 2024-04-24 NOTE — PROGRESS NOTES
Assessment/Plan:    Problem List Items Addressed This Visit       Dermatitis - Primary     - significantly improved  - can discontinue steroid cream. Continue Eucerin daily  - follow up with PCP as scheduled in 1 month  - notify office of any worsening symptoms sooner         Diminished pulses in lower extremity     Scheduled for arterial duplex 5/10                   M*VIOSO software was used to dictate this note.  It may contain errors with dictating incorrect words or incorrect spelling. Please contact the provider directly with any questions.    Subjective:      Patient ID: Judd Khan is a 78 y.o. male.    HPI    Patient presents today for ongoing follow up LLE dermatitis. He continues to use triamcinolone twice daily and is using Eucerin daily. Symptoms are significantly improved. He continues with skin color changes but inflammation is improved, redness is improved, dryness is improved.  Swelling is at baseline    The following portions of the patient's history were reviewed and updated as appropriate: allergies, current medications, past family history, past medical history, past social history, past surgical history, and problem list.    Review of Systems   Constitutional:  Negative for activity change, appetite change, chills and fever.   Skin:  Positive for color change and rash.         Past Medical History:   Diagnosis Date    Abnormal stress test 11/21/2019    Atherosclerosis of native coronary artery without angina pectoris     Glaucoma     Hypertension     Immunization refused     Mixed hyperlipidemia          Current Outpatient Medications:     amLODIPine (NORVASC) 5 mg tablet, Take 1 tablet (5 mg total) by mouth daily, Disp: 90 tablet, Rfl: 1    aspirin 81 MG tablet, Take 1 tablet by mouth daily, Disp: , Rfl:     furosemide (LASIX) 20 mg tablet, Take 1 tablet (20 mg total) by mouth daily, Disp: 30 tablet, Rfl: 3    lisinopril (ZESTRIL) 40 mg tablet, Take 1 tablet (40 mg total) by mouth daily,  "Disp: 90 tablet, Rfl: 1    lovastatin (MEVACOR) 40 MG tablet, Take 1 tablet (40 mg total) by mouth daily, Disp: 90 tablet, Rfl: 1    metoprolol tartrate (LOPRESSOR) 25 mg tablet, take 1 tablet by mouth twice a day, Disp: 180 tablet, Rfl: 1    Multiple Vitamin (DAILY VALUE MULTIVITAMIN) TABS, Take by mouth in the morning, Disp: , Rfl:     triamcinolone (KENALOG) 0.1 % cream, Apply topically 3 (three) times a day, Disp: 80 g, Rfl: 1    No Known Allergies    Social History   Past Surgical History:   Procedure Laterality Date    COLONOSCOPY  2018     Family History   Problem Relation Age of Onset    Hypertension Mother     Hypertension Father     Heart disease Father     Cancer Father        Objective:  /72 (BP Location: Left arm, Patient Position: Sitting, Cuff Size: Standard)   Pulse 60   Temp (!) 96.8 °F (36 °C) (Temporal)   Ht 5' 8.5\" (1.74 m)   Wt 83.8 kg (184 lb 12.8 oz)   SpO2 98%   BMI 27.69 kg/m²      Physical Exam  Vitals reviewed.   Constitutional:       General: He is not in acute distress.     Appearance: Normal appearance. He is not diaphoretic.   HENT:      Head: Normocephalic and atraumatic.   Eyes:      Extraocular Movements: Extraocular movements intact.      Conjunctiva/sclera: Conjunctivae normal.      Pupils: Pupils are equal, round, and reactive to light.   Cardiovascular:      Rate and Rhythm: Normal rate and regular rhythm.      Pulses:           Dorsalis pedis pulses are 1+ on the left side.        Posterior tibial pulses are 1+ on the left side.   Pulmonary:      Effort: Pulmonary effort is normal. No respiratory distress.      Breath sounds: Normal breath sounds. No wheezing, rhonchi or rales.   Musculoskeletal:      Right lower leg: Edema present.      Left lower leg: Edema present.   Skin:     Comments: Brown discoloration of LLE   Neurological:      Mental Status: He is alert and oriented to person, place, and time. Mental status is at baseline.      Gait: Gait normal. "   Psychiatric:         Mood and Affect: Mood normal.         Behavior: Behavior normal.         Thought Content: Thought content normal.         Judgment: Judgment normal.

## 2024-05-03 DIAGNOSIS — I10 HYPERTENSION, ESSENTIAL, BENIGN: ICD-10-CM

## 2024-05-05 RX ORDER — LISINOPRIL 40 MG/1
40 TABLET ORAL DAILY
Qty: 90 TABLET | Refills: 1 | Status: SHIPPED | OUTPATIENT
Start: 2024-05-05

## 2024-05-08 DIAGNOSIS — E78.00 HYPERCHOLESTEROLEMIA: ICD-10-CM

## 2024-05-08 RX ORDER — LOVASTATIN 40 MG/1
40 TABLET ORAL DAILY
Qty: 90 TABLET | Refills: 1 | Status: SHIPPED | OUTPATIENT
Start: 2024-05-08

## 2024-05-10 ENCOUNTER — HOSPITAL ENCOUNTER (OUTPATIENT)
Dept: NON INVASIVE DIAGNOSTICS | Facility: CLINIC | Age: 79
Discharge: HOME | End: 2024-05-10
Payer: MEDICARE

## 2024-05-10 DIAGNOSIS — R09.89 DIMINISHED PULSES IN LOWER EXTREMITY: ICD-10-CM

## 2024-05-10 PROCEDURE — 93925 LOWER EXTREMITY STUDY: CPT

## 2024-05-10 PROCEDURE — 93923 UPR/LXTR ART STDY 3+ LVLS: CPT

## 2024-05-10 PROCEDURE — 93922 UPR/L XTREMITY ART 2 LEVELS: CPT | Performed by: SURGERY

## 2024-05-10 PROCEDURE — 93925 LOWER EXTREMITY STUDY: CPT | Performed by: SURGERY

## 2024-05-15 ENCOUNTER — RA CDI HCC (OUTPATIENT)
Dept: OTHER | Facility: HOSPITAL | Age: 79
End: 2024-05-15

## 2024-05-22 ENCOUNTER — OFFICE VISIT (OUTPATIENT)
Age: 79
End: 2024-05-22
Payer: MEDICARE

## 2024-05-22 VITALS
BODY MASS INDEX: 27.77 KG/M2 | DIASTOLIC BLOOD PRESSURE: 72 MMHG | OXYGEN SATURATION: 99 % | TEMPERATURE: 97.4 F | WEIGHT: 183.2 LBS | HEIGHT: 68 IN | HEART RATE: 53 BPM | SYSTOLIC BLOOD PRESSURE: 154 MMHG

## 2024-05-22 DIAGNOSIS — Z00.00 MEDICARE ANNUAL WELLNESS VISIT, SUBSEQUENT: Primary | ICD-10-CM

## 2024-05-22 DIAGNOSIS — I10 HYPERTENSION, ESSENTIAL, BENIGN: ICD-10-CM

## 2024-05-22 DIAGNOSIS — R73.01 IMPAIRED FASTING GLUCOSE: ICD-10-CM

## 2024-05-22 PROCEDURE — 99214 OFFICE O/P EST MOD 30 MIN: CPT | Performed by: INTERNAL MEDICINE

## 2024-05-22 PROCEDURE — G0439 PPPS, SUBSEQ VISIT: HCPCS | Performed by: INTERNAL MEDICINE

## 2024-05-22 NOTE — ASSESSMENT & PLAN NOTE
Hypertension well-controlled on present medication regimen however I believe that the amlodipine may be contributing to the lower extremity edema in addition to the venous stasis.  Will reach out to his cardiologist, Dr. Pond's daughter to see if we can use an alternate medication in place of the amlodipine.  For now it is okay to continue with the Lasix is helping keep the edema down and also helping with the blood pressure control

## 2024-05-22 NOTE — PROGRESS NOTES
Ambulatory Visit  Name: Judd Khan      : 1945      MRN: [id number]  Encounter Provider: Jessica Anderson MD  Encounter Date: 2024   Encounter department: [facility] [place]    Assessment & Plan   1. Medicare annual wellness visit, subsequent  2. Hypertension, essential, benign  Assessment & Plan:  Hypertension well-controlled on present medication regimen however I believe that the amlodipine may be contributing to the lower extremity edema in addition to the venous stasis.  Will reach out to his cardiologist, Dr. Pond's daughter to see if we can use an alternate medication in place of the amlodipine.  For now it is okay to continue with the Lasix is helping keep the edema down and also helping with the blood pressure control       Preventive health issues were discussed with patient, and age appropriate screening tests were ordered as noted in patient's After Visit Summary. Personalized health advice and appropriate referrals for health education or preventive services given if needed, as noted in patient's After Visit Summary.    History of Present Illness     HPI     78-year-old male with past medical history significant for hypertension, hypercholesterolemia in good health here today for his follow-up and Medicare wellness visit  Feels well has no problems.  Always had an issue with swelling in both his lower legs and feet due to venous insufficiency.  Has had chronic infection of the 1 foot the left-sided 1 due to the stasis.  The wound seems to come on more so after he cuts the grass.  We finally got it to heal however few months ago he had a episode of it starting up again.  Was seen in the office and was given a round of antibiotics for it and the wound/rash has resolved..      Patient Care Team:  Jessica Anderson MD as PCP - General (Internal Medicine)  Michel Garcia MD    Review of Systems   Skin:  Positive for color change.        Both ankles and feet, 1+  pitting edema ,erythema changes of chronic venous stasis   All other systems reviewed and are negative.    Medical History Reviewed by provider this encounter:       Annual Wellness Visit Questionnaire   Judd is here for his Subsequent Wellness visit.     Health Risk Assessment:   Patient rates overall health as good. Patient feels that their physical health rating is same. Patient is satisfied with their life. Eyesight was rated as same. Hearing was rated as same. Patient feels that their emotional and mental health rating is same. Patient states they are never, rarely unusually tired/fatigued. Pain experienced in the last 7 days has been none. Patient states that he has experienced no weight loss or gain in last 6 months.     Depression Screening:   PHQ-2 Score: 0      Fall Risk Screening:   In the past year, patient has experienced: no history of falling in past year      Home Safety:  Patient does not have trouble with stairs inside or outside of their home. Patient has working smoke alarms and has working carbon monoxide detector. Home safety hazards include: none.     Nutrition:   Current diet is Regular and Limited junk food.     Medications:   Patient is currently taking over-the-counter supplements. OTC medications include: see medication list. Patient is able to manage medications.     Activities of Daily Living (ADLs)/Instrumental Activities of Daily Living (IADLs):   Walk and transfer into and out of bed and chair?: Yes  Dress and groom yourself?: Yes    Bathe or shower yourself?: Yes    Feed yourself? Yes  Do your laundry/housekeeping?: Yes  Manage your money, pay your bills and track your expenses?: Yes  Make your own meals?: Yes    Do your own shopping?: Yes    Previous Hospitalizations:   Any hospitalizations or ED visits within the last 12 months?: No      Advance Care Planning:   Living will: Yes    Advanced directive: Yes      PREVENTIVE SCREENINGS      Cardiovascular Screening:    General:  "Screening Not Indicated and History Lipid Disorder      Diabetes Screening:     General: Screening Current      Prostate Cancer Screening:    General: Screening Not Indicated      Lung Cancer Screening:     General: Screening Not Indicated      Hepatitis C Screening:    General: Screening Current    Screening, Brief Intervention, and Referral to Treatment (SBIRT)    Screening  Typical number of drinks in a day: 1  Typical number of drinks in a week: 7  Interpretation: Low risk drinking behavior.    Social Determinants of Health     Financial Resource Strain: Low Risk  (1/11/2023)    Overall Financial Resource Strain (CARDIA)    • Difficulty of Paying Living Expenses: Not hard at all   Food Insecurity: No Food Insecurity (5/22/2024)    Hunger Vital Sign    • Worried About Running Out of Food in the Last Year: Never true    • Ran Out of Food in the Last Year: Never true   Transportation Needs: No Transportation Needs (5/22/2024)    PRAPARE - Transportation    • Lack of Transportation (Medical): No    • Lack of Transportation (Non-Medical): No   Housing Stability: Low Risk  (5/22/2024)    Housing Stability Vital Sign    • Unable to Pay for Housing in the Last Year: No    • Number of Times Moved in the Last Year: 0    • Homeless in the Last Year: No   Utilities: Not At Risk (5/22/2024)    [facility] Utilities    • Threatened with loss of utilities: No     No results found.    Objective     /72 (BP Location: Left arm, Patient Position: Sitting, Cuff Size: Standard)   Pulse (!) 53   Temp (!) 97.4 °F (36.3 °C) (Temporal)   Ht 5' 8.19\" (1.732 m)   Wt 83.1 kg (183 lb 3.2 oz)   SpO2 99%   BMI 27.70 kg/m²     Physical Exam    Gen: healthy appearing, overweight  Heent: atraumatic, normocephalic, sclera is clear and PERRLA  Mouth: is moist  Neck: is supple, no JVD, no carotid bruits.   Heart: is regular Normal s1, s2, no murmurs, rubs, clicks or gallops.   Lungs: are clear to auscultation and percussion.  Abd: soft, " nontender, normal bowel sounds with no masses or organomegaly. No abdominal bruits  Ext:edema both ankles and feet with mild stasis edema  Skin: no rashes, ulcers  Mood: normal affect  Neuro: AAOx3     Administrative Statements   I have spent a total time of 25 minutes on 05/22/24 In caring for this patient including Diagnostic results, Prognosis, Risks and benefits of tx options, Instructions for management, Patient and family education, Importance of tx compliance, Risk factor reductions, Impressions, Counseling / Coordination of care, Documenting in the medical record, Reviewing / ordering tests, medicine, procedures  , Obtaining or reviewing history  , and Communicating with other healthcare professionals .

## 2024-05-22 NOTE — PATIENT INSTRUCTIONS
Medicare Preventive Visit Patient Instructions  Thank you for completing your Welcome to Medicare Visit or Medicare Annual Wellness Visit today. Your next wellness visit will be due in one year (5/23/2025).  The screening/preventive services that you may require over the next 5-10 years are detailed below. Some tests may not apply to you based off risk factors and/or age. Screening tests ordered at today's visit but not completed yet may show as past due. Also, please note that scanned in results may not display below.  Preventive Screenings:  Service Recommendations Previous Testing/Comments   Colorectal Cancer Screening  Colonoscopy    Fecal Occult Blood Test (FOBT)/Fecal Immunochemical Test (FIT)  Fecal DNA/Cologuard Test  Flexible Sigmoidoscopy Age: 45-75 years old   Colonoscopy: every 10 years (May be performed more frequently if at higher risk)  OR  FOBT/FIT: every 1 year  OR  Cologuard: every 3 years  OR  Sigmoidoscopy: every 5 years  Screening may be recommended earlier than age 45 if at higher risk for colorectal cancer. Also, an individualized decision between you and your healthcare provider will decide whether screening between the ages of 76-85 would be appropriate. Colonoscopy: 01/17/2018  FOBT/FIT: Not on file  Cologuard: Not on file  Sigmoidoscopy: Not on file          Prostate Cancer Screening Individualized decision between patient and health care provider in men between ages of 55-69   Medicare will cover every 12 months beginning on the day after your 50th birthday PSA: 0.6 ng/mL     Screening Not Indicated     Hepatitis C Screening Once for adults born between 1945 and 1965  More frequently in patients at high risk for Hepatitis C Hep C Antibody: 05/04/2022    Screening Current   Diabetes Screening 1-2 times per year if you're at risk for diabetes or have pre-diabetes Fasting glucose: No results in last 5 years (No results in last 5 years)  A1C: 5.4 % of total Hgb (11/10/2023)  Screening Current    Cholesterol Screening Once every 5 years if you don't have a lipid disorder. May order more often based on risk factors. Lipid panel: 11/10/2023  Screening Not Indicated  History Lipid Disorder      Other Preventive Screenings Covered by Medicare:  Abdominal Aortic Aneurysm (AAA) Screening: covered once if your at risk. You're considered to be at risk if you have a family history of AAA or a male between the age of 65-75 who smoking at least 100 cigarettes in your lifetime.  Lung Cancer Screening: covers low dose CT scan once per year if you meet all of the following conditions: (1) Age 55-77; (2) No signs or symptoms of lung cancer; (3) Current smoker or have quit smoking within the last 15 years; (4) You have a tobacco smoking history of at least 20 pack years (packs per day x number of years you smoked); (5) You get a written order from a healthcare provider.  Glaucoma Screening: covered annually if you're considered high risk: (1) You have diabetes OR (2) Family history of glaucoma OR (3)  aged 50 and older OR (4)  American aged 65 and older  Osteoporosis Screening: covered every 2 years if you meet one of the following conditions: (1) Have a vertebral abnormality; (2) On glucocorticoid therapy for more than 3 months; (3) Have primary hyperparathyroidism; (4) On osteoporosis medications and need to assess response to drug therapy.  HIV Screening: covered annually if you're between the age of 15-65. Also covered annually if you are younger than 15 and older than 65 with risk factors for HIV infection. For pregnant patients, it is covered up to 3 times per pregnancy.    Immunizations:  Immunization Recommendations   Influenza Vaccine Annual influenza vaccination during flu season is recommended for all persons aged >= 6 months who do not have contraindications   Pneumococcal Vaccine   * Pneumococcal conjugate vaccine = PCV13 (Prevnar 13), PCV15 (Vaxneuvance), PCV20 (Prevnar 20)  *  Pneumococcal polysaccharide vaccine = PPSV23 (Pneumovax) Adults 19-65 yo with certain risk factors or if 65+ yo  If never received any pneumonia vaccine: recommend Prevnar 20 (PCV20)  Give PCV20 if previously received 1 dose of PCV13 or PPSV23   Hepatitis B Vaccine 3 dose series if at intermediate or high risk (ex: diabetes, end stage renal disease, liver disease)   Respiratory syncytial virus (RSV) Vaccine - COVERED BY MEDICARE PART D  * RSVPreF3 (Arexvy) CDC recommends that adults 60 years of age and older may receive a single dose of RSV vaccine using shared clinical decision-making (SCDM)   Tetanus (Td) Vaccine - COST NOT COVERED BY MEDICARE PART B Following completion of primary series, a booster dose should be given every 10 years to maintain immunity against tetanus. Td may also be given as tetanus wound prophylaxis.   Tdap Vaccine - COST NOT COVERED BY MEDICARE PART B Recommended at least once for all adults. For pregnant patients, recommended with each pregnancy.   Shingles Vaccine (Shingrix) - COST NOT COVERED BY MEDICARE PART B  2 shot series recommended in those 19 years and older who have or will have weakened immune systems or those 50 years and older     Health Maintenance Due:      Topic Date Due   • Hepatitis C Screening  Completed   • Colorectal Cancer Screening  Discontinued     Immunizations Due:      Topic Date Due   • Pneumococcal Vaccine: 65+ Years (1 of 1 - PCV) Never done   • COVID-19 Vaccine (3 - 2023-24 season) 09/01/2023     Advance Directives   What are advance directives?  Advance directives are legal documents that state your wishes and plans for medical care. These plans are made ahead of time in case you lose your ability to make decisions for yourself. Advance directives can apply to any medical decision, such as the treatments you want, and if you want to donate organs.   What are the types of advance directives?  There are many types of advance directives, and each state has rules  about how to use them. You may choose a combination of any of the following:  Living will:  This is a written record of the treatment you want. You can also choose which treatments you do not want, which to limit, and which to stop at a certain time. This includes surgery, medicine, IV fluid, and tube feedings.   Durable power of  for healthcare (DPAHC):  This is a written record that states who you want to make healthcare choices for you when you are unable to make them for yourself. This person, called a proxy, is usually a family member or a friend. You may choose more than 1 proxy.  Do not resuscitate (DNR) order:  A DNR order is used in case your heart stops beating or you stop breathing. It is a request not to have certain forms of treatment, such as CPR. A DNR order may be included in other types of advance directives.  Medical directive:  This covers the care that you want if you are in a coma, near death, or unable to make decisions for yourself. You can list the treatments you want for each condition. Treatment may include pain medicine, surgery, blood transfusions, dialysis, IV or tube feedings, and a ventilator (breathing machine).  Values history:  This document has questions about your views, beliefs, and how you feel and think about life. This information can help others choose the care that you would choose.  Why are advance directives important?  An advance directive helps you control your care. Although spoken wishes may be used, it is better to have your wishes written down. Spoken wishes can be misunderstood, or not followed. Treatments may be given even if you do not want them. An advance directive may make it easier for your family to make difficult choices about your care.   Weight Management   Why it is important to manage your weight:  Being overweight increases your risk of health conditions such as heart disease, high blood pressure, type 2 diabetes, and certain types of cancer. It  can also increase your risk for osteoarthritis, sleep apnea, and other respiratory problems. Aim for a slow, steady weight loss. Even a small amount of weight loss can lower your risk of health problems.  How to lose weight safely:  A safe and healthy way to lose weight is to eat fewer calories and get regular exercise. You can lose up about 1 pound a week by decreasing the number of calories you eat by 500 calories each day.   Healthy meal plan for weight management:  A healthy meal plan includes a variety of foods, contains fewer calories, and helps you stay healthy. A healthy meal plan includes the following:  Eat whole-grain foods more often.  A healthy meal plan should contain fiber. Fiber is the part of grains, fruits, and vegetables that is not broken down by your body. Whole-grain foods are healthy and provide extra fiber in your diet. Some examples of whole-grain foods are whole-wheat breads and pastas, oatmeal, brown rice, and bulgur.  Eat a variety of vegetables every day.  Include dark, leafy greens such as spinach, kale, rebecca greens, and mustard greens. Eat yellow and orange vegetables such as carrots, sweet potatoes, and winter squash.   Eat a variety of fruits every day.  Choose fresh or canned fruit (canned in its own juice or light syrup) instead of juice. Fruit juice has very little or no fiber.  Eat low-fat dairy foods.  Drink fat-free (skim) milk or 1% milk. Eat fat-free yogurt and low-fat cottage cheese. Try low-fat cheeses such as mozzarella and other reduced-fat cheeses.  Choose meat and other protein foods that are low in fat.  Choose beans or other legumes such as split peas or lentils. Choose fish, skinless poultry (chicken or turkey), or lean cuts of red meat (beef or pork). Before you cook meat or poultry, cut off any visible fat.   Use less fat and oil.  Try baking foods instead of frying them. Add less fat, such as margarine, sour cream, regular salad dressing and mayonnaise to  foods. Eat fewer high-fat foods. Some examples of high-fat foods include french fries, doughnuts, ice cream, and cakes.  Eat fewer sweets.  Limit foods and drinks that are high in sugar. This includes candy, cookies, regular soda, and sweetened drinks.  Exercise:  Exercise at least 30 minutes per day on most days of the week. Some examples of exercise include walking, biking, dancing, and swimming. You can also fit in more physical activity by taking the stairs instead of the elevator or parking farther away from stores. Ask your healthcare provider about the best exercise plan for you.      © Copyright [employer] 2018 Information is for End User's use only and may not be sold, redistributed or otherwise used for commercial purposes. All illustrations and images included in CareNotes® are the copyrighted property of [employer].A[employer]M., Inc. or [employer]

## 2024-05-24 DIAGNOSIS — I10 HYPERTENSION, ESSENTIAL, BENIGN: ICD-10-CM

## 2024-05-24 RX ORDER — AMLODIPINE BESYLATE 5 MG/1
5 TABLET ORAL DAILY
Qty: 90 TABLET | Refills: 1 | Status: SHIPPED | OUTPATIENT
Start: 2024-05-24

## 2024-05-24 NOTE — TELEPHONE ENCOUNTER
Reason for call:   [x] Refill   [] Prior Auth  [] Other:     Office:   [x] PCP/Provider -   [] Specialty/Provider -     Medication: Amlodipine     Dose/Frequency: 5 mg tablet taken by mouth once daily     Quantity: 90    Pharmacy:   [id number] - [place] -   [address] [place] [address] [phone]     Does the patient have enough for 3 days?   [x] Yes   [] No - Send as HP to POD

## 2024-05-29 ENCOUNTER — TELEPHONE (OUTPATIENT)
Age: 79
End: 2024-05-29

## 2024-05-29 DIAGNOSIS — N40.0 BENIGN PROSTATIC HYPERPLASIA WITHOUT LOWER URINARY TRACT SYMPTOMS: ICD-10-CM

## 2024-05-29 DIAGNOSIS — Z12.5 SCREENING FOR PROSTATE CANCER: Primary | ICD-10-CM

## 2024-05-29 NOTE — TELEPHONE ENCOUNTER
Patient was calling in, just wondering if Dr. Anderson wanted the patient to have a PSA blood work completed as well, along with the other blood work orders that were placed.    If so, please have the order attached to patient's MYCHART. If this testing isnt necessary please advise back to patient.

## 2024-05-30 DIAGNOSIS — I10 HYPERTENSION, ESSENTIAL, BENIGN: ICD-10-CM

## 2024-05-30 DIAGNOSIS — I87.2 CHRONIC VENOUS INSUFFICIENCY: ICD-10-CM

## 2024-05-31 RX ORDER — FUROSEMIDE 20 MG/1
20 TABLET ORAL DAILY
Qty: 30 TABLET | Refills: 5 | Status: SHIPPED | OUTPATIENT
Start: 2024-05-31

## 2024-07-11 DIAGNOSIS — I10 HYPERTENSION, ESSENTIAL, BENIGN: ICD-10-CM

## 2024-07-11 RX ORDER — LISINOPRIL 40 MG/1
40 TABLET ORAL DAILY
Qty: 100 TABLET | Refills: 1 | Status: SHIPPED | OUTPATIENT
Start: 2024-07-11

## 2024-07-11 NOTE — TELEPHONE ENCOUNTER
Medication: lisinopril    Dose/Frequency: 40mg- take 1 tablet by mouth daily    Quantity: 90    Pharmacy: [id number] - [place] - [address] [place]      Office:   [x] PCP/Provider - Jessica Anderson  [] Speciality/Provider -     Does the patient have enough for 3 days?   [x] Yes   [] No - Send as HP to POD

## 2024-09-17 DIAGNOSIS — I87.2 CHRONIC VENOUS INSUFFICIENCY: ICD-10-CM

## 2024-09-17 DIAGNOSIS — I10 HYPERTENSION, ESSENTIAL, BENIGN: ICD-10-CM

## 2024-09-18 RX ORDER — FUROSEMIDE 20 MG
20 TABLET ORAL DAILY
Qty: 90 TABLET | Refills: 1 | Status: SHIPPED | OUTPATIENT
Start: 2024-09-18

## 2024-09-19 DIAGNOSIS — I10 HYPERTENSION, UNSPECIFIED TYPE: ICD-10-CM

## 2024-09-19 RX ORDER — METOPROLOL TARTRATE 25 MG/1
25 TABLET, FILM COATED ORAL 2 TIMES DAILY
Qty: 180 TABLET | Refills: 1 | Status: SHIPPED | OUTPATIENT
Start: 2024-09-19

## 2024-10-24 DIAGNOSIS — E78.00 HYPERCHOLESTEROLEMIA: ICD-10-CM

## 2024-10-24 RX ORDER — LOVASTATIN 40 MG/1
40 TABLET ORAL DAILY
Qty: 90 TABLET | Refills: 1 | Status: SHIPPED | OUTPATIENT
Start: 2024-10-24

## 2024-10-24 NOTE — TELEPHONE ENCOUNTER
Reason for call:   [x] Refill   [] Prior Auth  [] Other:     Office:   [x] PCP/Provider -   [] Specialty/Provider -     Medication: Lovastatin 40 mg, take 1 tablet by mouth daily       Pharmacy: [facility] [place]     Does the patient have enough for 3 days?   [x] Yes   [] No - Send as HP to POD

## 2024-11-10 DIAGNOSIS — I10 HYPERTENSION, ESSENTIAL, BENIGN: ICD-10-CM

## 2024-11-11 RX ORDER — AMLODIPINE BESYLATE 5 MG/1
5 TABLET ORAL DAILY
Qty: 90 TABLET | Refills: 1 | Status: SHIPPED | OUTPATIENT
Start: 2024-11-11

## 2024-12-21 LAB
ALBUMIN SERPL-MCNC: 4.3 G/DL (ref 3.6–5.1)
ALBUMIN/GLOB SERPL: 1.5 (CALC) (ref 1–2.5)
ALP SERPL-CCNC: 86 U/L (ref 35–144)
ALT SERPL-CCNC: 16 U/L (ref 9–46)
APPEARANCE UR: CLEAR
AST SERPL-CCNC: 29 U/L (ref 10–35)
BACTERIA UR QL AUTO: ABNORMAL /HPF
BASOPHILS # BLD AUTO: 41 CELLS/UL (ref 0–200)
BASOPHILS NFR BLD AUTO: 0.6 %
BILIRUB SERPL-MCNC: 0.6 MG/DL (ref 0.2–1.2)
BILIRUB UR QL STRIP: NEGATIVE
BUN SERPL-MCNC: 21 MG/DL (ref 7–25)
BUN/CREAT SERPL: NORMAL (CALC) (ref 6–22)
CALCIUM SERPL-MCNC: 9.1 MG/DL (ref 8.6–10.3)
CHLORIDE SERPL-SCNC: 101 MMOL/L (ref 98–110)
CHOLEST SERPL-MCNC: 191 MG/DL
CHOLEST/HDLC SERPL: 2.7 (CALC)
CO2 SERPL-SCNC: 29 MMOL/L (ref 20–32)
COLOR UR: YELLOW
CREAT SERPL-MCNC: 0.87 MG/DL (ref 0.7–1.22)
EOSINOPHIL # BLD AUTO: 83 CELLS/UL (ref 15–500)
EOSINOPHIL NFR BLD AUTO: 1.2 %
ERYTHROCYTE [DISTWIDTH] IN BLOOD BY AUTOMATED COUNT: 12.2 % (ref 11–15)
GFR/BSA.PRED SERPLBLD CYS-BASED-ARV: 87 ML/MIN/1.73M2
GLOBULIN SER CALC-MCNC: 2.9 G/DL (CALC) (ref 1.9–3.7)
GLUCOSE SERPL-MCNC: 89 MG/DL (ref 65–99)
GLUCOSE UR QL STRIP: NEGATIVE
HBA1C MFR BLD: 5.6 % OF TOTAL HGB
HCT VFR BLD AUTO: 41.7 % (ref 38.5–50)
HDLC SERPL-MCNC: 72 MG/DL
HGB BLD-MCNC: 13.5 G/DL (ref 13.2–17.1)
HGB UR QL STRIP: ABNORMAL
HYALINE CASTS #/AREA URNS LPF: ABNORMAL /LPF
KETONES UR QL STRIP: NEGATIVE
LDLC SERPL CALC-MCNC: 99 MG/DL (CALC)
LEUKOCYTE ESTERASE UR QL STRIP: NEGATIVE
LYMPHOCYTES # BLD AUTO: 1670 CELLS/UL (ref 850–3900)
LYMPHOCYTES NFR BLD AUTO: 24.2 %
MCH RBC QN AUTO: 31.2 PG (ref 27–33)
MCHC RBC AUTO-ENTMCNC: 32.4 G/DL (ref 32–36)
MCV RBC AUTO: 96.3 FL (ref 80–100)
MONOCYTES # BLD AUTO: 718 CELLS/UL (ref 200–950)
MONOCYTES NFR BLD AUTO: 10.4 %
NEUTROPHILS # BLD AUTO: 4388 CELLS/UL (ref 1500–7800)
NEUTROPHILS NFR BLD AUTO: 63.6 %
NITRITE UR QL STRIP: NEGATIVE
NONHDLC SERPL-MCNC: 119 MG/DL (CALC)
PH UR STRIP: 7.5 [PH] (ref 5–8)
PLATELET # BLD AUTO: 209 THOUSAND/UL (ref 140–400)
PMV BLD REES-ECKER: 11.2 FL (ref 7.5–12.5)
POTASSIUM SERPL-SCNC: 5.3 MMOL/L (ref 3.5–5.3)
PROT SERPL-MCNC: 7.2 G/DL (ref 6.1–8.1)
PROT UR QL STRIP: NEGATIVE
PSA SERPL-MCNC: 0.54 NG/ML
RBC # BLD AUTO: 4.33 MILLION/UL (ref 4.2–5.8)
RBC #/AREA URNS HPF: ABNORMAL /HPF
SODIUM SERPL-SCNC: 141 MMOL/L (ref 135–146)
SP GR UR STRIP: 1.01 (ref 1–1.03)
SQUAMOUS #/AREA URNS HPF: ABNORMAL /HPF
TRIGL SERPL-MCNC: 100 MG/DL
TSH SERPL-ACNC: 3.27 MIU/L (ref 0.4–4.5)
WBC # BLD AUTO: 6.9 THOUSAND/UL (ref 3.8–10.8)
WBC #/AREA URNS HPF: ABNORMAL /HPF

## 2025-01-14 ENCOUNTER — RESULTS FOLLOW-UP (OUTPATIENT)
Age: 80
End: 2025-01-14

## 2025-01-14 NOTE — RESULT ENCOUNTER NOTE
Happy New Year!    I wanted to mention that here is persistently traces of microscopic blood showing up on the routine annual urine analysis.  It may not be anything but it would be wise for you to see a Urologist.  We can discuss that at your upcoming appointment.

## 2025-01-15 ENCOUNTER — TELEPHONE (OUTPATIENT)
Dept: ADMINISTRATIVE | Facility: OTHER | Age: 80
End: 2025-01-15

## 2025-01-15 NOTE — TELEPHONE ENCOUNTER
01/15/25 11:41 AM    Patient contacted to bring Advance Directive, POLST, or Living Will document to next scheduled pcp visit.[facility] Department was unable to leave a message; no answer/ line busy.    Thank you.  Holly Higuera  [facility]

## 2025-01-21 ENCOUNTER — OFFICE VISIT (OUTPATIENT)
Age: 80
End: 2025-01-21
Payer: MEDICARE

## 2025-01-21 VITALS
OXYGEN SATURATION: 98 % | HEIGHT: 68 IN | BODY MASS INDEX: 27.74 KG/M2 | SYSTOLIC BLOOD PRESSURE: 156 MMHG | TEMPERATURE: 98.2 F | DIASTOLIC BLOOD PRESSURE: 80 MMHG | HEART RATE: 57 BPM | WEIGHT: 183 LBS

## 2025-01-21 DIAGNOSIS — I10 HYPERTENSION, ESSENTIAL, BENIGN: ICD-10-CM

## 2025-01-21 DIAGNOSIS — I87.2 CHRONIC VENOUS INSUFFICIENCY: Primary | ICD-10-CM

## 2025-01-21 DIAGNOSIS — E78.00 HYPERCHOLESTEROLEMIA: ICD-10-CM

## 2025-01-21 PROCEDURE — G2211 COMPLEX E/M VISIT ADD ON: HCPCS | Performed by: INTERNAL MEDICINE

## 2025-01-21 PROCEDURE — 99214 OFFICE O/P EST MOD 30 MIN: CPT | Performed by: INTERNAL MEDICINE

## 2025-01-21 NOTE — PROGRESS NOTES
Name: Judd Khan      : 1945      MRN: [id number]  Encounter Provider: Jessica Anderson MD  Encounter Date: 2025   Encounter department: [facility] PRIMARY C[facility] [place]    Assessment & Plan  Chronic venous insufficiency         Hypercholesterolemia         Hypertension, essential, benign           Assessment & Plan  1. Hypertension.  His blood pressure has shown improvement, likely due to the diuretic therapy. He is currently on amlodipine and a diuretic. He will continue with the current regimen of amlodipine and the diuretic.    2. Hyperlipidemia.  His cholesterol profile has improved significantly, with LDL reduced from 111 to 99, total cholesterol at 191, and HDL at 72. He will continue with the current medication regimen.    3. Medicare wellness visit.  His lab results are within normal limits, indicating no need for repeat blood work. He is neither diabetic nor prediabetic. His kidney and liver functions are satisfactory, and his blood counts are within the normal range. He is advised to maintain an active lifestyle, including regular walking, to improve circulation and manage blood pressure. He is also advised to limit his intake of Manhattan cocktails to one per day.     Chief Complaint   Patient presents with   • Follow-up     Labs 2024, no refills needed      History of Present Illness     History of Present Illness  The patient presents for a Medicare wellness visit.    He reports a significant improvement in his leg swelling, attributing this to the use of compression socks. He is currently on amlodipine and a diuretic, which he believes have contributed to his improved condition. He expresses a general aversion to medication but acknowledges their necessity. He has been under the care of a cardiologist, Dr. Benson, for the past year, with his next appointment scheduled for 2025. He reports no chest pain or shortness of breath. He maintains an active lifestyle,  including regular walking and lawn mowing, although he notes a decrease in activity during the winter months. He also reports weight gain during the winter season. He consumes alcohol, specifically Manhattan cocktails, with a frequency of one to two drinks per day.    He reports no skin issues and does not see a dermatologist. He wears a hat when cutting the grass.    SOCIAL HISTORY  The patient consumes alcohol, specifically Manhattan cocktails, with a frequency of one to two drinks per day.    FAMILY HISTORY  The patient mentions that his parents had high cholesterol.        Past Medical History:   Diagnosis Date   • Abnormal stress test 11/21/2019   • Atherosclerosis of native coronary artery without angina pectoris    • Glaucoma    • Hypertension    • Immunization refused    • Mixed hyperlipidemia      Past Surgical History:   Procedure Laterality Date   • COLONOSCOPY  2018     Family History   Problem Relation Age of Onset   • Hypertension Mother    • Hypertension Father    • Heart disease Father    • Cancer Father      Social History     Tobacco Use   • Smoking status: Never   • Smokeless tobacco: Never   Vaping Use   • Vaping status: Never Used   Substance and Sexual Activity   • Alcohol use: Yes     Alcohol/week: 7.0 standard drinks of alcohol     Types: 7 Shots of liquor per week   • Drug use: Never   • Sexual activity: Not on file     Current Outpatient Medications on File Prior to Visit   Medication Sig   • amLODIPine (NORVASC) 5 mg tablet take 1 tablet by mouth once daily   • aspirin 81 MG tablet Take 1 tablet by mouth daily   • furosemide (LASIX) 20 mg tablet take 1 tablet by mouth once daily   • lisinopril (ZESTRIL) 40 mg tablet Take 1 tablet (40 mg total) by mouth daily   • lovastatin (MEVACOR) 40 MG tablet Take 1 tablet (40 mg total) by mouth daily   • metoprolol tartrate (LOPRESSOR) 25 mg tablet take 1 tablet by mouth twice a day   • Multiple Vitamin (DAILY VALUE MULTIVITAMIN) TABS Take by mouth in  "the morning   • triamcinolone (KENALOG) 0.1 % cream Apply topically 3 (three) times a day (Patient not taking: Reported on 1/21/2025)     No Known Allergies  Immunization History   Administered Date(s) Administered   • COVID-19 PFIZER VACCINE 0.3 ML IM 02/12/2021, 03/05/2021   • Tdap 11/07/2017, 11/07/2017       Review of Systems - Negative except mild hearing loss, use of glasses and recurring rash left lower extremity felt to be related to contact dermatitis which has improved significantly        Objective   /80 (BP Location: Left arm, Patient Position: Sitting, Cuff Size: Adult)   Pulse 57   Temp 98.2 °F (36.8 °C) (Temporal)   Ht 5' 8\" (1.727 m)   Wt 83 kg (183 lb)   SpO2 98%   BMI 27.83 kg/m²     Physical Exam    Physical Exam    Gen: healthy appearing, overweight  Heent: atraumatic, normocephalic, sclera is clear and PERRLA  Mouth: is moist  Neck: is supple, no JVD, no carotid bruits.   Heart: is regular Normal s1, s2, no murmurs, rubs, clicks or gallops.   Lungs: are clear to auscultation and percussion.  Abd: soft, nontender, normal bowel sounds with no masses or organomegaly. No abdominal bruits  Ext: no edema, distal pulses normal  Skin: no rashes, ulcers  Mood: normal affect  Neuro: AAOx3     "

## 2025-03-16 DIAGNOSIS — I10 HYPERTENSION, UNSPECIFIED TYPE: ICD-10-CM

## 2025-03-17 RX ORDER — METOPROLOL TARTRATE 25 MG/1
25 TABLET, FILM COATED ORAL 2 TIMES DAILY
Qty: 180 TABLET | Refills: 1 | Status: SHIPPED | OUTPATIENT
Start: 2025-03-17

## 2025-04-09 DIAGNOSIS — I87.2 CHRONIC VENOUS INSUFFICIENCY: ICD-10-CM

## 2025-04-09 DIAGNOSIS — I10 HYPERTENSION, ESSENTIAL, BENIGN: ICD-10-CM

## 2025-04-09 NOTE — TELEPHONE ENCOUNTER
Refill must be reviewed and completed by the office or provider. The refill is unable to be approved or denied by the medication management team.      Last seen 02.2024 - Please review to see if the refill is appropriate.

## 2025-04-10 RX ORDER — FUROSEMIDE 20 MG/1
20 TABLET ORAL DAILY
Qty: 90 TABLET | Refills: 1 | Status: SHIPPED | OUTPATIENT
Start: 2025-04-10

## 2025-04-10 NOTE — TELEPHONE ENCOUNTER
Requested medication(s) are due for refill today: Yes  Patient has already received a courtesy refill: No  Other reason request has been forwarded to provider: yearly f/u scheduled

## 2025-04-14 DIAGNOSIS — E78.00 HYPERCHOLESTEROLEMIA: ICD-10-CM

## 2025-04-15 RX ORDER — LOVASTATIN 40 MG/1
40 TABLET ORAL DAILY
Qty: 90 TABLET | Refills: 1 | Status: SHIPPED | OUTPATIENT
Start: 2025-04-15

## 2025-05-07 DIAGNOSIS — I10 HYPERTENSION, ESSENTIAL, BENIGN: ICD-10-CM

## 2025-05-08 RX ORDER — AMLODIPINE BESYLATE 5 MG/1
5 TABLET ORAL DAILY
Qty: 90 TABLET | Refills: 1 | Status: SHIPPED | OUTPATIENT
Start: 2025-05-08

## 2025-07-05 DIAGNOSIS — I10 HYPERTENSION, UNSPECIFIED TYPE: ICD-10-CM

## 2025-07-07 RX ORDER — METOPROLOL TARTRATE 25 MG/1
25 TABLET, FILM COATED ORAL 2 TIMES DAILY
Qty: 180 TABLET | Refills: 0 | Status: SHIPPED | OUTPATIENT
Start: 2025-07-07

## 2025-07-08 ENCOUNTER — OFFICE VISIT (OUTPATIENT)
Age: 80
End: 2025-07-08
Payer: MEDICARE

## 2025-07-08 ENCOUNTER — TELEPHONE (OUTPATIENT)
Age: 80
End: 2025-07-08

## 2025-07-08 VITALS
SYSTOLIC BLOOD PRESSURE: 148 MMHG | DIASTOLIC BLOOD PRESSURE: 60 MMHG | TEMPERATURE: 99.1 F | BODY MASS INDEX: 27.28 KG/M2 | HEIGHT: 68 IN | OXYGEN SATURATION: 98 % | HEART RATE: 59 BPM | WEIGHT: 180 LBS

## 2025-07-08 DIAGNOSIS — N40.0 BENIGN PROSTATIC HYPERPLASIA WITHOUT LOWER URINARY TRACT SYMPTOMS: ICD-10-CM

## 2025-07-08 DIAGNOSIS — I10 HYPERTENSION, UNSPECIFIED TYPE: ICD-10-CM

## 2025-07-08 DIAGNOSIS — I87.2 CHRONIC VENOUS INSUFFICIENCY: ICD-10-CM

## 2025-07-08 DIAGNOSIS — R79.9 ABNORMAL FINDING OF BLOOD CHEMISTRY, UNSPECIFIED: ICD-10-CM

## 2025-07-08 DIAGNOSIS — I10 HYPERTENSION, ESSENTIAL, BENIGN: ICD-10-CM

## 2025-07-08 DIAGNOSIS — Z00.00 MEDICARE ANNUAL WELLNESS VISIT, SUBSEQUENT: Primary | ICD-10-CM

## 2025-07-08 DIAGNOSIS — E78.00 HYPERCHOLESTEROLEMIA: ICD-10-CM

## 2025-07-08 DIAGNOSIS — L30.9 DERMATITIS: ICD-10-CM

## 2025-07-08 DIAGNOSIS — I25.10 CORONARY ARTERY DISEASE INVOLVING NATIVE CORONARY ARTERY OF NATIVE HEART WITHOUT ANGINA PECTORIS: ICD-10-CM

## 2025-07-08 DIAGNOSIS — I89.0 LYMPHEDEMA: ICD-10-CM

## 2025-07-08 PROCEDURE — 99214 OFFICE O/P EST MOD 30 MIN: CPT | Performed by: INTERNAL MEDICINE

## 2025-07-08 PROCEDURE — G0439 PPPS, SUBSEQ VISIT: HCPCS | Performed by: INTERNAL MEDICINE

## 2025-07-08 RX ORDER — TRIAMCINOLONE ACETONIDE 1 MG/G
CREAM TOPICAL 3 TIMES DAILY PRN
Start: 2025-07-08

## 2025-07-08 RX ORDER — ATORVASTATIN CALCIUM 40 MG/1
40 TABLET, FILM COATED ORAL DAILY
Qty: 90 TABLET | Refills: 1 | Status: SHIPPED | OUTPATIENT
Start: 2025-07-08

## 2025-07-08 NOTE — PROGRESS NOTES
Name: Judd Khan      : 1945      MRN: [id number]  Encounter Provider: Jessica Anderson MD  Encounter Date: 2025   [facility]    :  Assessment & Plan  Medicare annual wellness visit, subsequent         Lymphedema         Hypertension, essential, benign    Orders:  •  CBC and differential; Future  •  Comprehensive metabolic panel; Future  •  Hemoglobin A1C; Future  •  Lipid Panel with Direct LDL reflex; Future  •  TSH, 3rd generation with Free T4 reflex; Future  •  UA w Reflex to Microscopic w Reflex to Culture; Future    Hypercholesterolemia    Orders:  •  atorvastatin (LIPITOR) 40 mg tablet; Take 1 tablet (40 mg total) by mouth daily    Chronic venous insufficiency         Coronary artery disease involving native coronary artery of native heart without angina pectoris         Dermatitis    Orders:  •  triamcinolone (KENALOG) 0.1 % cream; Apply topically 3 (three) times a day as needed for rash    Abnormal finding of blood chemistry, unspecified    Orders:  •  Hemoglobin A1C; Future    Benign prostatic hyperplasia without lower urinary tract symptoms    Orders:  •  PSA Total (Reflex To Free); Future    Hypertension, unspecified type           Assessment & Plan  1. Leg rash.  - The leg rash has shown improvement with the use of socks and steroid ointment.  - He will continue to use the steroid ointment as needed.  - No current use of the steroid ointment, but it remains available if needed.  - No further treatment required at this time.    2. Pedal edema.  - The pedal edema is managed with compression socks and diuretics.  - He is advised to continue wearing compression socks to help reduce swelling.  - Physical activity, such as walking, also helps reduce the swelling.  - No changes in current management plan.    3. Hematuria.  - Occasional blood in urine has been noted, but previous workups have not shown any concerning findings.  - An  ultrasound was ordered but not completed.  - He reports no current issues and prefers not to pursue further imaging at this time.  - No further action required unless symptoms change.    4. Cholesterol management.  - His LDL cholesterol level was previously recorded at 99, which is above the target range of 60.  - He will transition from lovastatin to atorvastatin 40 mg once his current supply of lovastatin is depleted.  - If he experiences any side effects from atorvastatin, he can revert to lovastatin.  - Prescription for atorvastatin 40 mg will be sent to the pharmacy.    5. Blood pressure management.  - His blood pressure is well controlled with metoprolol and lisinopril.  - A prescription for metoprolol will be sent to his pharmacy.  - No changes in current medication regimen.  - Continue monitoring blood pressure regularly.    6. Bigeminy.  - He reports chronic bigeminy, which resolves with exercise.  - No further intervention is required at this time as his condition remains stable.  - Previous Holter monitor results were normal.  - Continue current management and monitor for any changes.    7. Health maintenance.  - Blood work, including a PSA test, will be ordered for his next visit in 6 months.  - Previous blood work from 12/2024 was normal.  - Annual wellness visit and lab work to be scheduled in 12/2025.  - No additional health maintenance concerns at this time.         Chief Complaint   Patient presents with   • Medicare Wellness Visit   • Follow-up     6 months follow up      History of Present Illness     History of Present Illness  The patient presents for a wellness visit and follow up.    He reports that his leg rash has improved, which he attributes to the use of socks. He believes the rash was initially caused by grass exposure. He has discontinued the use of the steroid ointment and still has one unused tube.    Leg swelling continues to be an issue, although it is less severe when he wears  "socks. He notes that the swelling decreases after physical activity such as walking or mowing the lawn. He is on amlodipine and furosemide.    No prostate issues are reported, and he is not under the care of a urologist.    Currently, he is taking lovastatin for cholesterol management and has approximately 20 days' supply left.    For blood pressure management, he is on metoprolol and lisinopril. He has 3 pills of metoprolol left.    A history of bigeminy is noted, which resolves during exercise. He underwent Holter monitoring about 2 years ago, which showed no abnormalities. He has not been advised to get a pacemaker by cardiology.  No further workup is been recommended by them either at this point.  Review of Systems   Cardiovascular:  Positive for leg swelling.   Skin:  Positive for rash.   Allergic/Immunologic: Positive for environmental allergies.   All other systems reviewed and are negative.        Past Medical History[1]  Past Surgical History[2]  Family History[3]  Social History[4]  Medications[5]  No Known Allergies  Immunization History   Administered Date(s) Administered   • COVID-19 PFIZER VACCINE 0.3 ML IM 02/12/2021, 03/05/2021   • Tdap 11/07/2017, 11/07/2017     Objective   /60 (BP Location: Left arm, Patient Position: Sitting, Cuff Size: Standard)   Pulse 59   Temp 99.1 °F (37.3 °C) (Temporal)   Ht 5' 8\" (1.727 m)   Wt 81.6 kg (180 lb)   SpO2 98%   BMI 27.37 kg/m²     Physical Exam  HEENT : Normal  Neck : No JVD, No bruit.  Respiratory: Clear to auscultation, no wheezing, rales or rhonchi  Cardiovascular: Regular rate and rhythm, no murmurs, rubs, or gallops  Ext : Trace edema.  Good peripheral pulses           [1]  Past Medical History:  Diagnosis Date   • Abnormal stress test 11/21/2019   • Atherosclerosis of native coronary artery without angina pectoris    • Glaucoma    • Hypertension    • Immunization refused    • Mixed hyperlipidemia    [2]  Past Surgical History:  Procedure " Laterality Date   • COLONOSCOPY  2018   [3]  Family History  Problem Relation Name Age of Onset   • Hypertension Mother     • Hypertension Father     • Heart disease Father     • Cancer Father     [4]  Social History  Tobacco Use   • Smoking status: Never   • Smokeless tobacco: Never   Vaping Use   • Vaping status: Never Used   Substance and Sexual Activity   • Alcohol use: Yes     Alcohol/week: 7.0 standard drinks of alcohol     Types: 7 Shots of liquor per week   • Drug use: Never   [5]  Current Outpatient Medications on File Prior to Visit   Medication Sig   • amLODIPine (NORVASC) 5 mg tablet take 1 tablet by mouth once daily   • aspirin 81 MG tablet Take 1 tablet by mouth in the morning.   • furosemide (LASIX) 20 mg tablet take 1 tablet by mouth once daily   • lisinopril (ZESTRIL) 40 mg tablet Take 1 tablet (40 mg total) by mouth daily   • metoprolol tartrate (LOPRESSOR) 25 mg tablet Take 1 tablet (25 mg total) by mouth 2 (two) times a day   • Multiple Vitamin (DAILY VALUE MULTIVITAMIN) TABS Take by mouth in the morning   • [DISCONTINUED] lovastatin (MEVACOR) 40 MG tablet take 1 tablet by mouth once daily   • [DISCONTINUED] triamcinolone (KENALOG) 0.1 % cream Apply topically 3 (three) times a day (Patient not taking: Reported on 1/21/2025)

## 2025-07-08 NOTE — PROGRESS NOTES
Name: Judd Khan      : 1945      MRN: [id number]  Encounter Provider: Jessica Anderson MD  Encounter Date: 2025   [facility]    :  Assessment & Plan  Medicare annual wellness visit, subsequent         Lymphedema         Hypertension, essential, benign         Hypercholesterolemia         Chronic venous insufficiency         Coronary artery disease involving native coronary artery of native heart without angina pectoris  Continued follow up with Cardiology         Assessment & Plan           Preventive health issues were discussed with patient, and age appropriate screening tests were ordered as noted in patient's After Visit Summary. Personalized health advice and appropriate referrals for health education or preventive services given if needed, as noted in patient's After Visit Summary.      No chief complaint on file.     History of Present Illness   {?Quick Links Encounters * My Last Note * Last Note in Specialty * Snapshot * Since Last Visit * History :54907}  History of Present Illness      Patient Care Team:  Jessica Anderson MD as PCP - General (Internal Medicine)  Michel Garcia MD    Review of Systems  Annual Wellness Visit Questionnaire   Judd is here for his Subsequent Wellness visit.     Health Risk Assessment:   Patient rates overall health as good. Patient feels that their physical health rating is same. Patient is satisfied with their life. Eyesight was rated as same. Hearing was rated as same. Patient feels that their emotional and mental health rating is same. Patients states they are never, rarely angry. Patient states they are never, rarely unusually tired/fatigued. Pain experienced in the last 7 days has been none. Patient states that he has experienced no weight loss or gain in last 6 months.     Fall Risk Screening:   In the past year, patient has experienced: no history of falling in past year      Home  Safety:  Patient does not have trouble with stairs inside or outside of their home. Patient has working smoke alarms and has working carbon monoxide detector. Home safety hazards include: none.     Nutrition:   Current diet is Regular.     Medications:   Patient is currently taking over-the-counter supplements. OTC medications include: see medication list. Patient is able to manage medications.     Activities of Daily Living (ADLs)/Instrumental Activities of Daily Living (IADLs):   Walk and transfer into and out of bed and chair?: Yes  Dress and groom yourself?: Yes    Bathe or shower yourself?: Yes    Feed yourself? Yes  Do your laundry/housekeeping?: Yes  Manage your money, pay your bills and track your expenses?: Yes  Make your own meals?: Yes    Do your own shopping?: Yes    Durable Medical Equipment Suppliers  None    Previous Hospitalizations:   Any hospitalizations or ED visits within the last 12 months?: No      Advance Care Planning:   Living will: Yes    Durable POA for healthcare: Yes    Advanced directive: Yes      Preventive Screenings      Cardiovascular Screening:    General: Screening Not Indicated and History Lipid Disorder      Diabetes Screening:     General: Screening Current      Prostate Cancer Screening:    General: Screening Not Indicated      Lung Cancer Screening:     General: Screening Not Indicated      Hepatitis C Screening:    General: Screening Current    Immunizations:  - Immunizations due: Prevnar 20 and Zoster (Shingrix)    Screening, Brief Intervention, and Referral to Treatment (SBIRT)     Screening  Typical number of drinks in a day: 1  Typical number of drinks in a week: 7  Interpretation: Low risk drinking behavior.    AUDIT-C Screenin) How often did you have a drink containing alcohol in the past year? 4 or more times a week  2) How many drinks did you have on a typical day when you were drinking in the past year? 1 to 2  3) How often did you have 6 or more drinks on one  occasion in the past year? never    AUDIT-C Score: 4  Interpretation: Score 4-12 (male): POSITIVE screen for alcohol misuse    AUDIT Screenin) How often during the last year have you found that you were not able to stop drinking once you had started? 0 - never  5) How often during the last year have you failed to do what was normally expected from you because of drinking? 0 - never  6) How often during the last year have you needed a first drink in the morning to get yourself going after a heavy drinking session? 0 - never  7) How often during the last year have you had a feeling of guilt or remorse after drinking? 0 - never  8) How often during the last year have you been unable to remember what happened the night before because you had been drinking? 0 - never  9) Have you or someone else been injured as a result of your drinking? 0 - no  10) Has a relative or friend or a doctor or another health worker been concerned about your drinking or suggested you cut down? 0 - no    AUDIT Score: 4  Interpretation: Low risk alcohol consumption    Single Item Drug Screening:  How often have you used an illegal drug (including marijuana) or a prescription medication for non-medical reasons in the past year? never    Single Item Drug Screen Score: 0  Interpretation: Negative screen for possible drug use disorder    Social Drivers of Health     Financial Resource Strain: Low Risk  (2023)    Overall Financial Resource Strain ([facility])    • Difficulty of Paying Living Expenses: Not hard at all   Food Insecurity: No Food Insecurity (2025)    Nursing - Inadequate Food Risk Classification    • Worried About Running Out of Food in the Last Year: Never true    • Ran Out of Food in the Last Year: Never true   Transportation Needs: No Transportation Needs (2025)    PRAPARE - Transportation    • Lack of Transportation (Medical): No    • Lack of Transportation (Non-Medical): No   Housing Stability: Low Risk  (2025)     Housing Stability Vital Sign    • Unable to Pay for Housing in the Last Year: No    • Number of Times Moved in the Last Year: 0    • Homeless in the Last Year: No   Utilities: Not At Risk (7/7/2025)    [facility] Utilities    • Threatened with loss of utilities: No     No results found.    Objective {?Quick Links Trend Vitals * Enter New Vitals * Results Review * Timeline (Adult) * Labs * Imaging * Cardiology * Procedures * Lung Cancer Screening * Surgical eConsent :83748}  There were no vitals taken for this visit.    Physical Exam    Physical Exam    Gen: NAD  Heent: atraumatic, normocephalic  Mouth: is moist  Neck: is supple, no JVD, no carotid bruits.   Heart: is regular Normal s1, s2,  Lungs: are clear to auscultation  Abd: soft, non tender, NABS  Ext: B/L edema, distal pulses normal  Skin: no rashes, ulcers  Mood: normal affect  Neuro: AAOx3   {Administrative / Billing Section (Optional):55388}

## 2025-07-08 NOTE — ASSESSMENT & PLAN NOTE
Orders:  •  triamcinolone (KENALOG) 0.1 % cream; Apply topically 3 (three) times a day as needed for rash

## 2025-07-08 NOTE — TELEPHONE ENCOUNTER
Patient called stating he was seen today at office and A1C lab was ordered after appointment.  Patient gets lab at [facility].  Can lab please be printed and mailed to home address?

## 2025-07-08 NOTE — ASSESSMENT & PLAN NOTE
Orders:  •  CBC and differential; Future  •  Comprehensive metabolic panel; Future  •  Hemoglobin A1C; Future  •  Lipid Panel with Direct LDL reflex; Future  •  TSH, 3rd generation with Free T4 reflex; Future  •  UA w Reflex to Microscopic w Reflex to Culture; Future

## 2025-07-08 NOTE — PROGRESS NOTES
Name: Judd Khan      : 1945      MRN: [id number]  Encounter Provider: Jessica Anderson MD  Encounter Date: 2025   Encounter department: [facility] [place]  :  Assessment & Plan  Medicare annual wellness visit, subsequent         Lymphedema         Hypertension, essential, benign    Orders:  •  CBC and differential; Future  •  Comprehensive metabolic panel; Future  •  Hemoglobin A1C; Future  •  Lipid Panel with Direct LDL reflex; Future  •  TSH, 3rd generation with Free T4 reflex; Future  •  UA w Reflex to Microscopic w Reflex to Culture; Future    Hypercholesterolemia    Orders:  •  atorvastatin (LIPITOR) 40 mg tablet; Take 1 tablet (40 mg total) by mouth daily    Chronic venous insufficiency         Coronary artery disease involving native coronary artery of native heart without angina pectoris         Dermatitis    Orders:  •  triamcinolone (KENALOG) 0.1 % cream; Apply topically 3 (three) times a day as needed for rash    Abnormal finding of blood chemistry, unspecified    Orders:  •  Hemoglobin A1C; Future    Benign prostatic hyperplasia without lower urinary tract symptoms    Orders:  •  PSA Total (Reflex To Free); Future    Hypertension, unspecified type            Preventive health issues were discussed with patient, and age appropriate screening tests were ordered as noted in patient's After Visit Summary. Personalized health advice and appropriate referrals for health education or preventive services given if needed, as noted in patient's After Visit Summary.    History of Present Illness        Patient Care Team:  Jessica Anderson MD as PCP - General (Internal Medicine)  Michel Garcia MD    Review of Systems See attached  Medical History Reviewed by provider this encounter:       Annual Wellness Visit Questionnaire   Judd is here for his Subsequent Wellness visit. Last Medicare Wellness visit information reviewed, patient interviewed and updates made to  the record today.      Health Risk Assessment:   Patient rates overall health as good. Patient feels that their physical health rating is same. Patient is satisfied with their life. Eyesight was rated as same. Hearing was rated as same. Patient feels that their emotional and mental health rating is same. Patients states they are never, rarely angry. Patient states they are never, rarely unusually tired/fatigued. Pain experienced in the last 7 days has been none. Patient states that he has experienced no weight loss or gain in last 6 months.     Depression Screening:   PHQ-2 Score: 0      Fall Risk Screening:   In the past year, patient has experienced: no history of falling in past year      Home Safety:  Patient does not have trouble with stairs inside or outside of their home. Patient has working smoke alarms and has working carbon monoxide detector. Home safety hazards include: none.     Nutrition:   Current diet is Regular.     Medications:   Patient is currently taking over-the-counter supplements. OTC medications include: see medication list. Patient is able to manage medications.     Activities of Daily Living (ADLs)/Instrumental Activities of Daily Living (IADLs):   Walk and transfer into and out of bed and chair?: Yes  Dress and groom yourself?: Yes    Bathe or shower yourself?: Yes    Feed yourself? Yes  Do your laundry/housekeeping?: Yes  Manage your money, pay your bills and track your expenses?: Yes  Make your own meals?: Yes    Do your own shopping?: Yes    Durable Medical Equipment Suppliers  None    Previous Hospitalizations:   Any hospitalizations or ED visits within the last 12 months?: No      Advance Care Planning:   Living will: Yes    Durable POA for healthcare: Yes    Advanced directive: Yes      Preventive Screenings      Cardiovascular Screening:    General: Screening Not Indicated and History Lipid Disorder      Diabetes Screening:     General: Screening Current      Prostate Cancer  Screening:    General: Screening Not Indicated      Lung Cancer Screening:     General: Screening Not Indicated      Hepatitis C Screening:    General: Screening Current    Immunizations:  - Immunizations due: Prevnar 20 and Zoster (Shingrix)    Screening, Brief Intervention, and Referral to Treatment (SBIRT)     Screening  Typical number of drinks in a day: 1  Typical number of drinks in a week: 7  Interpretation: Low risk drinking behavior.    AUDIT-C Screenin) How often did you have a drink containing alcohol in the past year? 4 or more times a week  2) How many drinks did you have on a typical day when you were drinking in the past year? 1 to 2  3) How often did you have 6 or more drinks on one occasion in the past year? never    AUDIT-C Score: 4  Interpretation: Score 4-12 (male): POSITIVE screen for alcohol misuse    AUDIT Screenin) How often during the last year have you found that you were not able to stop drinking once you had started? 0 - never  5) How often during the last year have you failed to do what was normally expected from you because of drinking? 0 - never  6) How often during the last year have you needed a first drink in the morning to get yourself going after a heavy drinking session? 0 - never  7) How often during the last year have you had a feeling of guilt or remorse after drinking? 0 - never  8) How often during the last year have you been unable to remember what happened the night before because you had been drinking? 0 - never  9) Have you or someone else been injured as a result of your drinking? 0 - no  10) Has a relative or friend or a doctor or another health worker been concerned about your drinking or suggested you cut down? 0 - no    AUDIT Score: 4  Interpretation: Low risk alcohol consumption    Single Item Drug Screening:  How often have you used an illegal drug (including marijuana) or a prescription medication for non-medical reasons in the past year?  "never    Single Item Drug Screen Score: 0  Interpretation: Negative screen for possible drug use disorder    Social Drivers of Health     Financial Resource Strain: Low Risk  (1/11/2023)    Overall Financial Resource Strain (CARDIA)    • Difficulty of Paying Living Expenses: Not hard at all   Food Insecurity: No Food Insecurity (7/7/2025)    Nursing - Inadequate Food Risk Classification    • Worried About Running Out of Food in the Last Year: Never true    • Ran Out of Food in the Last Year: Never true   Transportation Needs: No Transportation Needs (7/7/2025)    PRAPARE - Transportation    • Lack of Transportation (Medical): No    • Lack of Transportation (Non-Medical): No   Housing Stability: Low Risk  (7/7/2025)    Housing Stability Vital Sign    • Unable to Pay for Housing in the Last Year: No    • Number of Times Moved in the Last Year: 0    • Homeless in the Last Year: No   Utilities: Not At Risk (7/7/2025)    [facility] Utilities    • Threatened with loss of utilities: No     No results found.    Objective   /60 (BP Location: Left arm, Patient Position: Sitting, Cuff Size: Standard)   Pulse 59   Temp 99.1 °F (37.3 °C) (Temporal)   Ht 5' 8\" (1.727 m)   Wt 81.6 kg (180 lb)   SpO2 98%   BMI 27.37 kg/m²          "

## 2025-07-11 DIAGNOSIS — I10 HYPERTENSION, ESSENTIAL, BENIGN: ICD-10-CM

## 2025-07-11 NOTE — TELEPHONE ENCOUNTER
Reason for call:   [x] Refill   [] Prior Auth  [] Other:     Office:   [x] PCP/Provider - Jessica Anderson MD   [] Specialty/Provider -     Medication: lisinopril (ZESTRIL)    Dose/Frequency: 40 mg    Quantity: 90    [id number]     Local Pharmacy   Does the patient have enough for 3 days?   [x] Yes   [] No - Send as HP to POD

## 2025-07-12 RX ORDER — LISINOPRIL 40 MG/1
40 TABLET ORAL DAILY
Qty: 90 TABLET | Refills: 1 | Status: SHIPPED | OUTPATIENT
Start: 2025-07-12